# Patient Record
Sex: FEMALE | Race: WHITE | Employment: FULL TIME | URBAN - METROPOLITAN AREA
[De-identification: names, ages, dates, MRNs, and addresses within clinical notes are randomized per-mention and may not be internally consistent; named-entity substitution may affect disease eponyms.]

---

## 2017-09-01 ENCOUNTER — OFFICE VISIT (OUTPATIENT)
Dept: ORTHOPEDIC SURGERY | Age: 62
End: 2017-09-01

## 2017-09-01 VITALS
DIASTOLIC BLOOD PRESSURE: 79 MMHG | TEMPERATURE: 98.1 F | RESPIRATION RATE: 16 BRPM | OXYGEN SATURATION: 98 % | BODY MASS INDEX: 35.57 KG/M2 | WEIGHT: 181.2 LBS | SYSTOLIC BLOOD PRESSURE: 140 MMHG | HEART RATE: 86 BPM | HEIGHT: 60 IN

## 2017-09-01 DIAGNOSIS — Z98.890 H/O FOOT SURGERY: ICD-10-CM

## 2017-09-01 DIAGNOSIS — M79.2 NEURITIS: Primary | ICD-10-CM

## 2017-09-01 RX ORDER — LIDOCAINE 50 MG/G
PATCH TOPICAL
Qty: 1 PACKAGE | Refills: 1 | Status: SHIPPED | OUTPATIENT
Start: 2017-09-01 | End: 2018-03-07

## 2017-09-01 NOTE — PROGRESS NOTES
AMBULATORY PROGRESS NOTE      Patient: Itz Baeza             MRN: [id number]     SSN: [id number] Body mass index is 35.39 kg/(m^2). YOB: 1955     AGE: 63 y.o. EX: female    PCP: Luther Bojorquez MD       IMPRESSION/DIAGNOSIS AND TREATMENT PLAN      DIAGNOSES  1. Neuritis    2. H/O foot surgery        Orders Placed This Encounter    [[id number]] Foot Min 3V    lidocaine (LIDODERM) 5 %      Itz Baeza understands her diagnoses and the proposed plan. PLAN //  HPI AND EXAMINATION      Gonzales Hobbs IS A 63 y.o. female who presents to my outpatient office for evaluation of:      My overall impression is a 21-year-old female who has had spontaneous pain and discomfort to the right great toe first MTP region and at the TMT region for about one month now. She had a remote bunionectomy surgery by Dr. Arnoldo Mccrudy. Abhishek Springer, 20 years ago and did quite well from this. She had a proximal osteotomy of her first metatarsal in a number two toe hammertoe PIP resection arthroplasty with a K-wire fixation with K-wire fixation removal.  for the last month, however, she reports having a burning, tingling type pain along the dorsomedial portion of her foot, and difficulty allowing anything to touch the medial portion of her foot, i.e. bedsheets and the water from the shower stall. She denies any history of any pack troubles. She denies any history of multiple sclerosis. She has no history of diabetes and no history of trauma. She arrives here wearing flip flops after having been unable to put on an enclosed-type shoe for about one month now. She has no history of inflammatory arthropathy such as rheumatoid arthritis, lupus, psoriasis, and/or gout. Her x-rays are dictated in the diagnostic section of the report.   It does show some mild osteoarthritic changes to the first MTP region, a shortened, first metatarsal with some mild osteoarthritic changes to the right great toe first MTP region, as there is some slight joint space narrowing and an elongated second metatarsal as a result of her bunionectomy procedure. There is some slight extension of the right number two toe but no dislocation to the right number two toe at the MTP region. On examination, the patient is alert and follows commands. She is in no acute distress. Her affect and mood are appropriate. She arrives here unaccompanied. She is wearing flip flops. Her right foot is examined. She has a well-healed, remote surgical scar along the medial portion of the right first ray that goes from her first TMT region going all the way down towards the first MTP region distally. She has some numbness in the dorsomedial cutaneous nerve, terminal branch of the SPN nerve. She has some slight reddened discoloration along the medial portion of the right great toe and to the lateral portion of the right great toe and to the lateral portion of the right great toe. However, when I touch the lateral portion of her right great toe, there is no numbness or tingling on this lateral portion. It is the medial portion along the dorsomedial cutaneous nerve distribution where she has this dysesthetic discomfort with light touch to this region, positive Tinels sign. I cannot palpate a firm fullness to this terminal branch of this SPN nerve along the medial portion of her foot. She has a well-healed, surgical, remote scar. Otherwise, her pulses are intact. Toes are nice and warm and pink. There are no signs of DVT and/or thrombophlebitis. Clinically, there are no gross signs of RSD. The discomfort she has is in the terminal branch of the dorsomedial cutaneous nerve, terminal branch of the SPN nerve. The sural nerve is normal.  The saphenous nerve is normal in terms of light touch and deep touch. There is no tenderness in this region. The DPN nerve is normal in the first web space and into the dorsomedial portion of her foot.  Again, her pulses are intact. Toes are nice and warm and pink. There are no signs of DVT and/or thrombophlebitis. I had a lengthy discussion with her. My impression is dorsomedial cutaneous nerve neuritis. I am not sure why this started just one month ago. recommendation is to wear an open type shoe at this point. I am going to put her on a Lidoderm patch, 12 hours on and 12 hours off for about three weeks to see if this helps her at all. Should this not help her, I am going to recommend starting her on Neurontin. We will probably start her on Neurontin 300 mg one po q,h.s. for several weeks and then see whether we need to go up on this. She will require an extra-depth type shoe particularly when the weather changes so there is minimal pressure on the medial portion of her foot. CHART REVIEW      Past Medical History:   Diagnosis Date    Chronic kidney disease     kidney stones    Joint swelling      Current Outpatient Prescriptions   Medication Sig    lidocaine (LIDODERM) 5 % Apply patch to the affected area for 12 hours a day and remove for 12 hours a day.  ciprofloxacin HCl (CIPRO) 500 mg tablet Take one upon receiving the rx, then one at bedtime or 12 hours later    oxyCODONE-acetaminophen (PERCOCET) 5-325 mg per tablet Take 1 Tab by mouth every four (4) hours as needed for Pain.  tamsulosin (FLOMAX) 0.4 mg capsule Take 1 Cap by mouth daily.  oxybutynin chloride XL (DITROPAN XL) 5 mg CR tablet Take 1 Tab by mouth daily.  traMADol (ULTRAM) 50 mg tablet Take 50 mg by mouth every six (6) hours as needed for Pain. Indications: PAIN    furosemide (LASIX) 20 mg tablet Take 20 mg by mouth as needed. Indications: EDEMA    ergocalciferol (VITAMIN D2) 50,000 unit capsule Take 50,000 Units by mouth.  magnesium oxide (MAGOX) 400 mg tablet Take 400 mg by mouth daily.  KLOR-CON 10 10 mEq tablet      No current facility-administered medications for this visit.       Allergies   Allergen Reactions    Iodinated Contrast- Oral And Iv Dye Other (comments)     Past Surgical History:   Procedure Laterality Date    HX APPENDECTOMY      HX GI      flopy colon    HX GYN      ov cyst hyst     HX ORTHOPAEDIC      ft foot reconst     Social History     Occupational History    Not on file. Social History Main Topics    Smoking status: Current Every Day Smoker     Packs/day: 0.50    Smokeless tobacco: Never Used    Alcohol use Yes      Comment: social    Drug use: No    Sexual activity: Yes     Family History   Problem Relation Age of Onset    Cancer Mother     Alzheimer Mother     Cancer Father         REVIEW OF SYSTEMS : 9/1/2017  ALL BELOW ARE Negative except : SEE HPI       REVIEW OF SYSTEMS : 9/1/2017  ALL BELOW ARE Negative except : SEE HPI     CONSTITUTIONAL: denies chills, fatigue, fever, weight change   PSYCH: denies anxiety, depression, irritability or mood swings   ENT: denies - headaches, hearing change, nasal congestion, oral lesions, or sore throat   HEM/ONC denies - bleeding problems, bruising, pallor or swollen lymph nodes   ENDO: denies hot flashes, polydipsia/polyuria or temperature intolerance   RESP: denies - cough, shortness of breath or wheezing   CV: denies - chest pain, edema or palpitations, ORTIZ  GI: denies - abdominal pain, change in bowel habits, constipation, diarrhea or nausea/vomiting   : denies - dysuria, hematuria, incontinence, pelvic pain   MSK: denies  - See HPI. NEURO: denies - confusion, headaches, seizures or weakness   DERM: denies - dry skin, hair changes, rash or skin lesion changes  VASCULAR: Peripheral Vascular: No calf pain, vascular vein tenderness to calf pain              No calf throbbing, posterior knee throbbing pain      DIAGNOSTIC IMAGING        Dictation on: 09/01/2017  9:27 AM by: Bree Patel [[id number]]          Please see above section of this report. I have personally reviewed the results of the above study.  The interpretation of this study is my professional opinion.       Maura Guerra MD  9/1/2017  9:28 AM

## 2017-09-01 NOTE — PROCEDURES
X-rays of the right foot, three views, AP, lateral, and oblique: There is some narrowing of the first TMT region. I can see where she had an osteotomy of the first metatarsal, corrective osteotomy. Her BRIAN angle is very small. Her first metatarsophalangeal joint is shorter on the right compared to that on the left after having had her osteotomy of the proximal metatarsal 20 years ago. She has slight flexion alignment of the right number two toe MTP region. Otherwise, no dislocation.

## 2017-09-01 NOTE — PATIENT INSTRUCTIONS
X-rays from today were reviewed and discussed with you today     Superficial peroneal nerve inflamed  Discussed ordering a EMG/NCS of the right lower extremity ( We will hold off test at this time)  Discussed a lidoderm patch (12 hours on, 12 hours off)  Dicussed Neurontin or Lyrica  STRESS VITAMIN B  FIT FLOPS WWWLFIT FLOPS  Follow up with Dr. Meg Anne 3-4 weeks  Call if symptoms worsen [phone]  Budin splint has been given to you today for the 2nd toe on the right foot         Electromyogram (EMG) and Nerve Conduction Studies: About These Tests  What are they? An electromyogram (EMG) measures the electrical activity of your muscles when you are not using them (at rest) and when you tighten them (muscle contraction). Nerve conduction studies (NCS) measure how well and how fast the nerves can send electrical signals. EMG and nerve conduction studies are often done together. If they are done together, the nerve conduction studies are done before the EMG. Why are they done? You may need an EMG to find diseases that damage your muscles or nerves or to find why you cannot move your muscles (paralysis), why they feel weak, or why they twitch. You may need nerve conduction studies to find damage to the nerves that lead from the brain and spinal cord to the rest of the body (peripheral nervous system). Nerve conduction studies are often used to help find nerve disorders, such as carpal tunnel syndrome. How can you prepare for these tests? · Tell your doctors ALL the medicines, vitamins, supplements, and herbal remedies you take. Some medicines can affect the test results. You may need to stop taking some medicines before you have this test.  · If you take aspirin or some other blood thinner, be sure to talk to your doctor. He or she will tell you if you should stop taking it before your test. Make sure that you understand exactly what your doctor wants you to do. · Wear loose-fitting clothing.  You may be given a hospital gown to wear. · The electrodes for the test are attached to your skin. Your skin needs to be clean and free of sprays, oils, creams, and lotions. What happens during the tests? You lie on a table or bed or sit in a reclining chair so your muscles are relaxed. For an EMG:  · Your doctor will insert a needle electrode into a muscle. This will record the electrical activity while the muscle is at rest. You may feel a quick, sharp pain when the needle electrode is put into a muscle. · Your doctor will ask you to tighten the same muscle slowly and steadily while the electrical activity is recorded. · Your doctor may move the electrode to a different area of the muscle or a different muscle. For nerve conduction studies:  · Your doctor will attach two types of electrodes to your skin. ¨ One type of electrode is placed over a nerve and will give the nerve an electrical pulse. ¨ The other type of electrode is placed over the muscle that the nerve controls. It will record how long it takes the muscle to react to the electrical pulse. · You will be able to feel the electrical pulses. They are small shocks and are safe. What else should you know about these tests? · After an EMG, you may be sore and have a tingling feeling in your muscles for up to 2 days. You may have small bruises or swelling at the needle site. · For an EMG, you may be asked to sign a consent form. Talk to your doctor about any concerns you have about the need for the test, its risks, how it will be done, or what the results will mean. How long do they take? · An EMG may take 30 to 60 minutes. · Nerve conduction tests may take from 15 minutes to 1 hour or more. It depends on how many nerves and muscles your doctor tests. What happens after these tests? · If any of the test areas are sore:  ¨ Put ice or a cold pack on the area for 10 to 20 minutes at a time. Put a thin cloth between the ice and your skin.   ¨ Take an over-the-counter pain medicine, such as acetaminophen (Tylenol), ibuprofen (Advil, Motrin), or naproxen (Aleve). Be safe with medicines. Read and follow all instructions on the label. · You will probably be able to go home right away. · You can go back to your usual activities right away. When should you call for help? Watch closely for changes in your health, and be sure to contact your doctor if:  · Muscle pain from an EMG test gets worse or you have swelling, tenderness, or pus at any of the needle sites. · You have any problems that you think may be from the test.  · You have any questions about the test or have not received your results. Follow-up care is a key part of your treatment and safety. Be sure to make and go to all appointments, and call your doctor if you are having problems. It's also a good idea to keep a list of the medicines you take. Ask your doctor when you can expect to have your test results. Where can you learn more? Go to http://bud-ad.info/. Enter B904 in the search box to learn more about \"Electromyogram (EMG) and Nerve Conduction Studies: About These Tests. \"  Current as of: October 14, 2016  Content Version: 11.3  © 9373-3120 [employer]. Care instructions adapted under license by [employer] (which disclaims liability or warranty for this information). If you have questions about a medical condition or this instruction, always ask your healthcare professional. [place] [address] any warranty or liability for your use of this information.

## 2017-09-01 NOTE — PROGRESS NOTES
Patient is here today complaining of right great toe pain. She states the the pain is \"right on top of the big toe\"  She denies any injury. She has had a previous surgery on this foot by Dr. Daad Martinez. She states she is only able to wear flip flops. Patient states this pain started about a month ago.

## 2017-09-01 NOTE — MR AVS SNAPSHOT
Visit Information Date & Time Provider Department Dept. Phone Encounter #  
 9/1/2017  8:30 AM Mirian Navarro, 27 [facility] [phone] [id number] Upcoming Health Maintenance Date Due Hepatitis C Screening 1955 Pneumococcal 19-64 Medium Risk (1 of 1 - PPSV23) 4/16/1976 FOBT Q 1 YEAR AGE 50-75 4/16/2007 ZOSTER VACCINE AGE 60> 2/16/2017 INFLUENZA AGE 9 TO ADULT 8/1/2017 PAP AKA CERVICAL CYTOLOGY 1/1/2018 BREAST CANCER SCRN MAMMOGRAM 11/8/2018 DTaP/Tdap/Td series (2 - Td) 6/23/2025 Allergies as of 9/1/2017  Review Complete On: 9/1/2017 By: Mirian Navarro MD  
  
 Severity Noted Reaction Type Reactions Iodinated Contrast- Oral And Iv Dye  06/06/2014   Systemic Other (comments) Current Immunizations  Reviewed on 7/11/2014 Name Date Tdap 6/23/2015  9:25 AM  
  
 Not reviewed this visit You Were Diagnosed With   
  
 Codes Comments Neuritis    -  Primary ICD-10-CM: M79.2 ICD-9-CM: 729.2 RIGHT DORSO MEDIAL CUTANEOUS NEURITIS (TERMINAL SPN NERVE) H/O foot surgery     ICD-10-CM: Z98.890 ICD-9-CM: V15.29 Vitals BP Pulse Temp Resp Height(growth percentile) Weight(growth percentile) 140/79 86 98.1 °F (36.7 °C) 16 5' (1.524 m) 181 lb 3.2 oz (82.2 kg) SpO2 BMI OB Status Smoking Status 98% 35.39 kg/m2 Hysterectomy Current Every Day Smoker Vitals History BMI and BSA Data Body Mass Index Body Surface Area  
 35.39 kg/m 2 1.87 m 2 Preferred Pharmacy Pharmacy Name Phone [facility], [facility] [phone] Your Updated Medication List  
  
   
This list is accurate as of: 9/1/17  9:33 AM.  Always use your most recent med list.  
  
  
  
  
 ciprofloxacin HCl 500 mg tablet Commonly known as:  CIPRO Take one upon receiving the rx, then one at bedtime or 12 hours later KLOR-CON 10 10 mEq tablet Generic drug:  potassium chloride SR  
  
 LASIX 20 mg tablet Generic drug:  furosemide Take 20 mg by mouth as needed. Indications: EDEMA  
  
 lidocaine 5 % Commonly known as:  [place] Apply patch to the affected area for 12 hours a day and remove for 12 hours a day. MAGOX 400 mg tablet Generic drug:  magnesium oxide Take 400 mg by mouth daily. oxybutynin chloride XL 5 mg CR tablet Commonly known as:  DITROPAN XL Take 1 Tab by mouth daily. oxyCODONE-acetaminophen 5-325 mg per tablet Commonly known as:  PERCOCET Take 1 Tab by mouth every four (4) hours as needed for Pain.  
  
 tamsulosin 0.4 mg capsule Commonly known as:  FLOMAX Take 1 Cap by mouth daily. traMADol 50 mg tablet Commonly known as:  ULTRAM  
Take 50 mg by mouth every six (6) hours as needed for Pain. Indications: PAIN  
  
 VITAMIN D2 50,000 unit capsule Generic drug:  ergocalciferol Take 50,000 Units by mouth. Prescriptions Sent to Pharmacy Refills  
 lidocaine (LIDODERM) 5 % 1 Sig: Apply patch to the affected area for 12 hours a day and remove for 12 hours a day. Class: Normal  
 Pharmacy: [id number] [place] [address], [address] [place] [address] Ph #: [phone] We Performed the Following AMB POC XRAY, FOOT; COMPLETE, 3+ VIEW [93029 CPT(R)] Patient Instructions X-rays from today were reviewed and discussed with you today Superficial peroneal nerve inflamed Discussed ordering a EMG/NCS of the right lower extremity ( We will hold off test at this time) Discussed a lidoderm patch (12 hours on, 12 hours off) Dicussed Neurontin or Lyrica STRESS VITAMIN B 
FIT FLOPS WWWLFIT FLOPS Follow up with Dr. Suzanne Erwin 3-4 weeks Call if symptoms worsen [phone] Budin splint has been given to you today for the 2nd toe on the right foot Electromyogram (EMG) and Nerve Conduction Studies: About These Tests What are they? An electromyogram (EMG) measures the electrical activity of your muscles when you are not using them (at rest) and when you tighten them (muscle contraction). Nerve conduction studies (NCS) measure how well and how fast the nerves can send electrical signals. EMG and nerve conduction studies are often done together. If they are done together, the nerve conduction studies are done before the EMG. Why are they done? You may need an EMG to find diseases that damage your muscles or nerves or to find why you cannot move your muscles (paralysis), why they feel weak, or why they twitch. You may need nerve conduction studies to find damage to the nerves that lead from the brain and spinal cord to the rest of the body (peripheral nervous system). Nerve conduction studies are often used to help find nerve disorders, such as carpal tunnel syndrome. How can you prepare for these tests? · Tell your doctors ALL the medicines, vitamins, supplements, and herbal remedies you take. Some medicines can affect the test results. You may need to stop taking some medicines before you have this test. 
· If you take aspirin or some other blood thinner, be sure to talk to your doctor. He or she will tell you if you should stop taking it before your test. Make sure that you understand exactly what your doctor wants you to do. · Wear loose-fitting clothing. You may be given a hospital gown to wear. · The electrodes for the test are attached to your skin. Your skin needs to be clean and free of sprays, oils, creams, and lotions. What happens during the tests? You lie on a table or bed or sit in a reclining chair so your muscles are relaxed. For an EMG: 
· Your doctor will insert a needle electrode into a muscle. This will record the electrical activity while the muscle is at rest. You may feel a quick, sharp pain when the needle electrode is put into a muscle. · Your doctor will ask you to tighten the same muscle slowly and steadily while the electrical activity is recorded. · Your doctor may move the electrode to a different area of the muscle or a different muscle. For nerve conduction studies: 
· Your doctor will attach two types of electrodes to your skin. ¨ One type of electrode is placed over a nerve and will give the nerve an electrical pulse. ¨ The other type of electrode is placed over the muscle that the nerve controls. It will record how long it takes the muscle to react to the electrical pulse. · You will be able to feel the electrical pulses. They are small shocks and are safe. What else should you know about these tests? · After an EMG, you may be sore and have a tingling feeling in your muscles for up to 2 days. You may have small bruises or swelling at the needle site. · For an EMG, you may be asked to sign a consent form. Talk to your doctor about any concerns you have about the need for the test, its risks, how it will be done, or what the results will mean. How long do they take? · An EMG may take 30 to 60 minutes. · Nerve conduction tests may take from 15 minutes to 1 hour or more. It depends on how many nerves and muscles your doctor tests. What happens after these tests? · If any of the test areas are sore: ¨ Put ice or a cold pack on the area for 10 to 20 minutes at a time. Put a thin cloth between the ice and your skin. ¨ Take an over-the-counter pain medicine, such as acetaminophen (Tylenol), ibuprofen (Advil, Motrin), or naproxen (Aleve). Be safe with medicines. Read and follow all instructions on the label. · You will probably be able to go home right away. · You can go back to your usual activities right away. When should you call for help? Watch closely for changes in your health, and be sure to contact your doctor if: · Muscle pain from an EMG test gets worse or you have swelling, tenderness, or pus at any of the needle sites. · You have any problems that you think may be from the test. 
· You have any questions about the test or have not received your results. Follow-up care is a key part of your treatment and safety. Be sure to make and go to all appointments, and call your doctor if you are having problems. It's also a good idea to keep a list of the medicines you take. Ask your doctor when you can expect to have your test results. Where can you learn more? Go to http://bud-ad.info/. Enter J849 in the search box to learn more about \"Electromyogram (EMG) and Nerve Conduction Studies: About These Tests. \" Current as of: October 14, 2016 Content Version: 11.3 © 2297-7550 [employer]. Care instructions adapted under license by [employer] (which disclaims liability or warranty for this information). If you have questions about a medical condition or this instruction, always ask your healthcare professional. [address] any warranty or liability for your use of this information. Patient Instructions History Introducing [facility]! Jenise Devine introduces Route[employer] patient portal. Now you can access parts of your medical record, email your doctor's office, and request medication refills online. 1. In your internet browser, go to [email or web address] 2. Click on the First Time User? Click Here link in the Sign In box. You will see the New Member Sign Up page. 3. Enter your [employer] Access Code exactly as it appears below. You will not need to use this code after youve completed the sign-up process. If you do not sign up before the expiration date, you must request a new code. · Routezilla Access Code: [email or web address] Expires: 11/30/2017  9:25 AM 
 
4.  Enter the last four digits of your Social Security Number (xxxx) and Date of Birth (mm/dd/yyyy) as indicated and click Submit. You will be taken to the next sign-up page. 5. Create a [facility] ID. This will be your [facility] login ID and cannot be changed, so think of one that is secure and easy to remember. 6. Create a [facility] password. You can change your password at any time. 7. Enter your Password Reset Question and Answer. This can be used at a later time if you forget your password. 8. Enter your e-mail address. You will receive e-mail notification when new information is available in [address] 9. Click Sign Up. You can now view and download portions of your medical record. 10. Click the Download Summary menu link to download a portable copy of your medical information. If you have questions, please visit the Frequently Asked Questions section of the [facility] website. Remember, [facility] is NOT to be used for urgent needs. For medical emergencies, dial 911. Now available from your iPhone and Android! Please provide this summary of care documentation to your next provider. Your primary care clinician is listed as Harvey Fair. If you have any questions after today's visit, please call [phone].

## 2017-09-26 ENCOUNTER — OFFICE VISIT (OUTPATIENT)
Dept: ORTHOPEDIC SURGERY | Age: 62
End: 2017-09-26

## 2017-09-26 VITALS
TEMPERATURE: 98.3 F | BODY MASS INDEX: 35.73 KG/M2 | SYSTOLIC BLOOD PRESSURE: 138 MMHG | WEIGHT: 182 LBS | OXYGEN SATURATION: 97 % | HEIGHT: 60 IN | HEART RATE: 88 BPM | DIASTOLIC BLOOD PRESSURE: 71 MMHG

## 2017-09-26 DIAGNOSIS — G57.91 NEURITIS OF RIGHT FOOT: Primary | ICD-10-CM

## 2017-09-26 NOTE — PATIENT INSTRUCTIONS
Please follow up in 2 months. You are advised to contact us if your condition worsens. Arch Pain: Exercises  Your Care Instructions  Here are some examples of typical rehabilitation exercises for your condition. Start each exercise slowly. Ease off the exercise if you start to have pain. Your doctor or physical therapist will tell you when you can start these exercises and which ones will work best for you. How to do the exercises  Plantar fascia stretch    1. Sit in a chair and put your affected foot on your other knee. 2. Hold the heel of your foot in one hand, and grasp your toes with the other hand. 3. Pull on your heel (toward your body), and at the same time pull your toes back with your other hand. 4. You should feel a stretch along the bottom of your foot. 5. Hold 15 to 30 seconds. 6. Repeat 2 to 4 times. Plantar fascia stretch (kneeling)    Note: You may want to place a pillow under your knees for this exercise. 1. Get on your hands and knees on the floor. Keep your heels pointing up and the balls of your feet and your toes on the floor. 2. Slowly sit back toward your ankles. 3. If this is too hard, you can try doing it one leg at a time. Stand up, and then kneel on one knee and keep the other leg forward. Place the foot of your forward leg flat on the ground and bend that knee. The heel on the leg still behind you should point up. The ball and toes of that foot should be on the floor. Sit back toward that ankle. 4. Hold 15 to 30 seconds. 5. Repeat 2 to 4 times. Switch legs if you are doing this one leg at a time. Plantar fascia self-massage    1. Sit in a chair. 2. Place your affected foot on a firm, tube-shaped object, such as a can or water bottle. 3. Roll your foot back and forth over the object to massage the bottom of your foot. 4. If you want to do ice massage, fill a water bottle about three-fourths of the way full and freeze before using.   5. Continue for 2 to 5 minutes. Bilateral calf stretch (knees straight)    1. Place a book on the floor a few inches from a wall or countertop, and put the balls of your feet on it. Your heels should be on the floor. The book needs to be thick enough so that you can feel a gentle stretch in each calf. If you are not steady on your feet, hold on to a chair, counter, or wall while you do this stretch. 2. Keep your knees straight, and lean forward until you feel a stretch in each calf. 3. To get more stretch, add another book or use a thicker book, such as a phone book, a dictionary, or an encyclopedia. 4. Hold the stretch for at least 15 to 30 seconds. 5. Repeat 2 to 4 times. Bilateral calf stretch (knees bent)    1. Place a book on the floor a few inches from a wall or countertop, and put the balls of your feet on it. Your heels should be on the floor. The book needs to be thick enough so that you can feel a gentle stretch in each calf. If you are not steady on your feet, hold on to a chair, counter, or wall while you do this stretch. 2. Bend your knees, and lean forward until you feel a stretch in each calf. 3. To get more stretch, add another book or use a thicker book, such as a phone book, a dictionary, or an encyclopedia. 4. Hold the stretch for at least 15 to 30 seconds. 5. Repeat 2 to 4 times. [place] pick-ups    1. Put some marbles on the floor next to a cup.  2. Sit down, and use the toes of your affected foot to lift up one marble from the floor at a time. Then try to put the marble in the cup.  3. Repeat 8 to 12 times. Towel scrunches    1. Sit down, and place your affected foot on a towel on the floor. You may also do this with both feet on the towel. 2. Scrunch the towel toward you with your toes. Then use your toes to push the towel back into place. 3. Repeat 8 to 12 times. Heel raises on a step    1. Stand on the bottom step of a staircase, facing up toward the stairs. Put the balls of your feet on the step.  If you are not steady on your feet, hold on to the banister or wall. 2. Keeping both knees straight, slowly lift your heels above the step so that you are standing on your toes. Then slowly lower your heels below the step and toward the floor. 3. Return to the starting position, with your feet even with the step. 4. Repeat 8 to 12 times. Follow-up care is a key part of your treatment and safety. Be sure to make and go to all appointments, and call your doctor if you are having problems. It's also a good idea to know your test results and keep a list of the medicines you take. Where can you learn more? Go to http://bud-ad.info/. Enter H119 in the search box to learn more about \"Arch Pain: Exercises. \"  Current as of: March 21, 2017  Content Version: 11.3  © 1828-3281 [employer]. Care instructions adapted under license by [employer] (which disclaims liability or warranty for this information). If you have questions about a medical condition or this instruction, always ask your healthcare professional. [address] any warranty or liability for your use of this information.

## 2017-09-26 NOTE — PROGRESS NOTES
AMBULATORY PROGRESS NOTE      Patient: Scout Lopez             MRN: [id number]     SSN: [id number] Body mass index is 35.54 kg/(m^2). YOB: 1955     AGE: 63 y.o. EX: female    PCP: Nasreen Lamas MD    IMPRESSION/DIAGNOSIS AND TREATMENT PLAN     DIAGNOSES  1. Neuritis        Orders Placed This Encounter    AMB SUPPLY ORDER      Scout Lopez understands her diagnoses and the proposed plan. Plan:    1) Provide Budin Splints  2) Continue activity as tolerated  3) Continue using Lidoderm 5% patches    RTO - 2 months    HPI AND EXAMINATION     Scout Lopez IS A 63 y.o. female who presents to my outpatient office for evaluation of neuritis, and a h/o foot surgery. At last visit, I prescribed Lidoderm 5% patches. The patient presents to the office today stating that she is doing much better since she started using Lidoderm 5% patches, and Budin splints. She notes that she bought a new pair of sandals that have provided relief. The patient understands the information provided to her today. ANKLE/FOOT right    Psychiatry: Alert, Oriented x 3; Speech normal in context and clarity,            Memory intact grossly, no involuntary movements - tremors, no dementia  Gait: slow  Tenderness: mild tenderness dorsal aspect of foot  Cutaneous: WNL   Joint Motion: WNL  Joint / Tendon Stability: No Ankle or Subtalar instability or joint laxity. No peroneal sublux ability or dislocation  Alignment:  Normal Foot Alignment and  Flexible  Neuro Motor/Sensory: NL/NL  Vascular: NL foot/ankle pulses  Lymphatics: No extremity lymphedema, No calf swelling, no tenderness to calf muscles. CHART REVIEW     Past Medical History:   Diagnosis Date    Chronic kidney disease     kidney stones    Joint swelling      Current Outpatient Prescriptions   Medication Sig    lidocaine (LIDODERM) 5 % Apply patch to the affected area for 12 hours a day and remove for 12 hours a day.     furosemide (LASIX) 20 mg tablet Take 20 mg by mouth as needed. Indications: EDEMA    ciprofloxacin HCl (CIPRO) 500 mg tablet Take one upon receiving the rx, then one at bedtime or 12 hours later    oxyCODONE-acetaminophen (PERCOCET) 5-325 mg per tablet Take 1 Tab by mouth every four (4) hours as needed for Pain.  tamsulosin (FLOMAX) 0.4 mg capsule Take 1 Cap by mouth daily.  oxybutynin chloride XL (DITROPAN XL) 5 mg CR tablet Take 1 Tab by mouth daily.  traMADol (ULTRAM) 50 mg tablet Take 50 mg by mouth every six (6) hours as needed for Pain. Indications: PAIN    ergocalciferol (VITAMIN D2) 50,000 unit capsule Take 50,000 Units by mouth.  magnesium oxide (MAGOX) 400 mg tablet Take 400 mg by mouth daily.  KLOR-CON 10 10 mEq tablet      No current facility-administered medications for this visit. Allergies   Allergen Reactions    Iodinated Contrast- Oral And Iv Dye Other (comments)     Past Surgical History:   Procedure Laterality Date    HX APPENDECTOMY      HX GI      flopy colon    HX GYN      ov cyst hyst     HX ORTHOPAEDIC      ft foot reconst     Social History     Occupational History    Not on file. Social History Main Topics    Smoking status: Current Every Day Smoker     Packs/day: 0.50    Smokeless tobacco: Never Used    Alcohol use Yes      Comment: social    Drug use: No    Sexual activity: Yes     Family History   Problem Relation Age of Onset    Cancer Mother     Alzheimer Mother     Cancer Father        REVIEW OF SYSTEMS : 9/26/2017  ALL BELOW ARE Negative except : SEE HPI       Constitutional: Negative for fever, chills and weight loss. Neg Weigh Loss  Cardiovascular: Negative for chest pain, claudication and leg swelling. SOB, ORTIZ   Gastrointestinal: Negative for  pain, N/V/D/C, Blood in stool or urine,dysuria, hematuria,        Incontinence, pelvic pain  Musculoskeletal: see HPI. Neurological: Negative for dizziness and weakness.                  Negative for headaches,Visual Changes, Confusion, Seizures,   Psychiatric/Behavioral: Negative for depression, memory loss and substance abuse. Extremities:  Negative for  hair changes, rash or skin lesion changes. Hematologic: Negative for Bleeding problems, bruising, pallor or swollen lymph nodes. Peripheral Vascular: No calf pain, vascular vein tenderness to calf pain              No calf throbbing, posterior knee throbbing pain    DIAGNOSTIC IMAGING     No notes on file    Written by Alma Deal, as dictated by Ashtyn Euceda MD. IDr., Ashtyn Euceda MD, confirm that all documentation is accurate.

## 2017-09-26 NOTE — MR AVS SNAPSHOT
Visit Information Date & Time Provider Department Dept. Phone Encounter #  
 9/26/2017  8:45 AM Florentino Seip, [address] [facility] [phone] Upcoming Health Maintenance Date Due Hepatitis C Screening 1955 Pneumococcal 19-64 Medium Risk (1 of 1 - PPSV23) 4/16/1976 FOBT Q 1 YEAR AGE 50-75 4/16/2007 ZOSTER VACCINE AGE 60> 2/16/2017 INFLUENZA AGE 9 TO ADULT 8/1/2017 PAP AKA CERVICAL CYTOLOGY 1/1/2018 BREAST CANCER SCRN MAMMOGRAM 11/8/2018 DTaP/Tdap/Td series (2 - Td) 6/23/2025 Allergies as of 9/26/2017  Review Complete On: 9/26/2017 By: William Baels Severity Noted Reaction Type Reactions Iodinated Contrast- Oral And Iv Dye  06/06/2014   Systemic Other (comments) Current Immunizations  Reviewed on 7/11/2014 Name Date Tdap 6/23/2015  9:25 AM  
  
 Not reviewed this visit Vitals BP Pulse Temp Height(growth percentile) Weight(growth percentile) SpO2  
 138/71 88 98.3 °F (36.8 °C) (Oral) 5' (1.524 m) 182 lb (82.6 kg) 97% BMI OB Status Smoking Status 35.54 kg/m2 Hysterectomy Current Every Day Smoker BMI and BSA Data Body Mass Index Body Surface Area 35.54 kg/m 2 1.87 m 2 Preferred Pharmacy Pharmacy Name Phone [facility], [address] [place] [address] [phone] Your Updated Medication List  
  
   
This list is accurate as of: 9/26/17  9:48 AM.  Always use your most recent med list.  
  
  
  
  
 ciprofloxacin HCl 500 mg tablet Commonly known as:  CIPRO Take one upon receiving the rx, then one at bedtime or 12 hours later KLOR-CON 10 10 mEq tablet Generic drug:  potassium chloride SR  
  
 LASIX 20 mg tablet Generic drug:  furosemide Take 20 mg by mouth as needed. Indications: EDEMA  
  
 lidocaine 5 % Commonly known as:  Jaylen Byrd  
 Apply patch to the affected area for 12 hours a day and remove for 12 hours a day. MAGOX 400 mg tablet Generic drug:  magnesium oxide Take 400 mg by mouth daily. oxybutynin chloride XL 5 mg CR tablet Commonly known as:  DITROPAN XL Take 1 Tab by mouth daily. oxyCODONE-acetaminophen 5-325 mg per tablet Commonly known as:  PERCOCET Take 1 Tab by mouth every four (4) hours as needed for Pain.  
  
 tamsulosin 0.4 mg capsule Commonly known as:  FLOMAX Take 1 Cap by mouth daily. traMADol 50 mg tablet Commonly known as:  ULTRAM  
Take 50 mg by mouth every six (6) hours as needed for Pain. Indications: PAIN  
  
 VITAMIN D2 50,000 unit capsule Generic drug:  ergocalciferol Take 50,000 Units by mouth. Patient Instructions Please follow up in 2 months. You are advised to contact us if your condition worsens. Arch Pain: Exercises Your Care Instructions Here are some examples of typical rehabilitation exercises for your condition. Start each exercise slowly. Ease off the exercise if you start to have pain. Your doctor or physical therapist will tell you when you can start these exercises and which ones will work best for you. How to do the exercises Plantar fascia stretch 1. Sit in a chair and put your affected foot on your other knee. 2. Hold the heel of your foot in one hand, and grasp your toes with the other hand. 3. Pull on your heel (toward your body), and at the same time pull your toes back with your other hand. 4. You should feel a stretch along the bottom of your foot. 5. Hold 15 to 30 seconds. 6. Repeat 2 to 4 times. Plantar fascia stretch (kneeling) Note: You may want to place a pillow under your knees for this exercise. 1. Get on your hands and knees on the floor. Keep your heels pointing up and the balls of your feet and your toes on the floor. 2. Slowly sit back toward your ankles. 3. If this is too hard, you can try doing it one leg at a time. Stand up, and then kneel on one knee and keep the other leg forward. Place the foot of your forward leg flat on the ground and bend that knee. The heel on the leg still behind you should point up. The ball and toes of that foot should be on the floor. Sit back toward that ankle. 4. Hold 15 to 30 seconds. 5. Repeat 2 to 4 times. Switch legs if you are doing this one leg at a time. Plantar fascia self-massage 1. Sit in a chair. 2. Place your affected foot on a firm, tube-shaped object, such as a can or water bottle. 3. Roll your foot back and forth over the object to massage the bottom of your foot. 4. If you want to do ice massage, fill a water bottle about three-fourths of the way full and freeze before using. 5. Continue for 2 to 5 minutes. Bilateral calf stretch (knees straight) 1. Place a book on the floor a few inches from a wall or countertop, and put the balls of your feet on it. Your heels should be on the floor. The book needs to be thick enough so that you can feel a gentle stretch in each calf. If you are not steady on your feet, hold on to a chair, counter, or wall while you do this stretch. 2. Keep your knees straight, and lean forward until you feel a stretch in each calf. 3. To get more stretch, add another book or use a thicker book, such as a phone book, a dictionary, or an encyclopedia. 4. Hold the stretch for at least 15 to 30 seconds. 5. Repeat 2 to 4 times. Bilateral calf stretch (knees bent) 1. Place a book on the floor a few inches from a wall or countertop, and put the balls of your feet on it. Your heels should be on the floor. The book needs to be thick enough so that you can feel a gentle stretch in each calf. If you are not steady on your feet, hold on to a chair, counter, or wall while you do this stretch. 2. Bend your knees, and lean forward until you feel a stretch in each calf. 3. To get more stretch, add another book or use a thicker book, such as a phone book, a dictionary, or an encyclopedia. 4. Hold the stretch for at least 15 to 30 seconds. 5. Repeat 2 to 4 times. [place] pick-ups 1. Put some marbles on the floor next to a cup. 
2. Sit down, and use the toes of your affected foot to lift up one marble from the floor at a time. Then try to put the marble in the cup. 
3. Repeat 8 to 12 times. Towel scrunches 1. Sit down, and place your affected foot on a towel on the floor. You may also do this with both feet on the towel. 2. Scrunch the towel toward you with your toes. Then use your toes to push the towel back into place. 3. Repeat 8 to 12 times. Heel raises on a step 1. Stand on the bottom step of a staircase, facing up toward the stairs. Put the balls of your feet on the step. If you are not steady on your feet, hold on to the banister or wall. 2. Keeping both knees straight, slowly lift your heels above the step so that you are standing on your toes. Then slowly lower your heels below the step and toward the floor. 3. Return to the starting position, with your feet even with the step. 4. Repeat 8 to 12 times. Follow-up care is a key part of your treatment and safety. Be sure to make and go to all appointments, and call your doctor if you are having problems. It's also a good idea to know your test results and keep a list of the medicines you take. Where can you learn more? Go to http://bud-ad.info/. Enter H119 in the search box to learn more about \"Arch Pain: Exercises. \" Current as of: March 21, 2017 Content Version: 11.3 © 2169-0039 [employer], [employer]. Care instructions adapted under license by [employer] (which disclaims liability or warranty for this information).  If you have questions about a medical condition or this instruction, always ask your healthcare professional. [employer] disclaims any warranty or liability for your use of this information. Introducing [facility]! [facility] introduces Explain My Surgery patient portal. Now you can access parts of your medical record, email your doctor's office, and request medication refills online. 1. In your internet browser, go to [email or web address] 2. Click on the First Time User? Click Here link in the Sign In box. You will see the New Member Sign Up page. 3. Enter your Explain My Surgery Access Code exactly as it appears below. You will not need to use this code after youve completed the sign-up process. If you do not sign up before the expiration date, you must request a new code. · Explain My Surgery Access Code: [email or web address] Expires: 11/30/2017  9:25 AM 
 
4. Enter the last four digits of your Social Security Number (xxxx) and Date of Birth (mm/dd/yyyy) as indicated and click Submit. You will be taken to the next sign-up page. 5. Create a Explain My Surgery ID. This will be your Explain My Surgery login ID and cannot be changed, so think of one that is secure and easy to remember. 6. Create a Explain My Surgery password. You can change your password at any time. 7. Enter your Password Reset Question and Answer. This can be used at a later time if you forget your password. 8. Enter your e-mail address. You will receive e-mail notification when new information is available in [address] 9. Click Sign Up. You can now view and download portions of your medical record. 10. Click the Download Summary menu link to download a portable copy of your medical information. If you have questions, please visit the Frequently Asked Questions section of the Explain My Surgery website. Remember, Explain My Surgery is NOT to be used for urgent needs. For medical emergencies, dial 911. Now available from your iPhone and Android! Please provide this summary of care documentation to your next provider. Your primary care clinician is listed as Arcelia Mehrdad. If you have any questions after today's visit, please call [phone].

## 2017-11-27 ENCOUNTER — OFFICE VISIT (OUTPATIENT)
Dept: ORTHOPEDIC SURGERY | Age: 62
End: 2017-11-27

## 2017-11-27 VITALS
WEIGHT: 181.6 LBS | SYSTOLIC BLOOD PRESSURE: 137 MMHG | HEART RATE: 85 BPM | DIASTOLIC BLOOD PRESSURE: 76 MMHG | BODY MASS INDEX: 35.65 KG/M2 | HEIGHT: 60 IN | OXYGEN SATURATION: 99 % | TEMPERATURE: 97.7 F

## 2017-11-27 NOTE — PROGRESS NOTES
AMBULATORY PROGRESS NOTE      Patient: Naveen Contreras             MRN: [id number]     SSN: [id number] There is no height or weight on file to calculate BMI. YOB: 1955     AGE: 63 y.o. EX: female    PCP: Judeen Severs, MD    IMPRESSION/DIAGNOSIS AND TREATMENT PLAN     DIAGNOSES  No diagnosis found. No orders of the defined types were placed in this encounter. Naveen Contreras understands her diagnoses and the proposed plan. Plan:    1)  2)    RTO     HPI AND EXAMINATION     Jorge Hobbs IS A 63 y.o. female who presents to my outpatient office for follow up of neuritis, and a h/o foot surgery. At last visit, I provided Budin Splints, recommended to continue using Lidoderm 5% patches, and continue activity as tolerated. The patient presents to the office today ***. ANKLE/FOOT right     Psychiatry: Alert, Oriented x 3; Speech normal in context and clarity,                                 Memory intact grossly, no involuntary movements - tremors, no dementia  Gait: slow  Tenderness: mild tenderness dorsal aspect of foot  Cutaneous: WNL   Joint Motion: WNL  Joint / Tendon Stability: No Ankle or Subtalar instability or joint laxity. No peroneal sublux ability or dislocation  Alignment:  Normal Foot Alignment and  Flexible  Neuro Motor/Sensory: NL/NL  Vascular: NL foot/ankle pulses  Lymphatics: No extremity lymphedema, No calf swelling, no tenderness to calf muscles. ***  CHART REVIEW     Past Medical History:   Diagnosis Date    Chronic kidney disease     kidney stones    Joint swelling      Current Outpatient Prescriptions   Medication Sig    lidocaine (LIDODERM) 5 % Apply patch to the affected area for 12 hours a day and remove for 12 hours a day.     ciprofloxacin HCl (CIPRO) 500 mg tablet Take one upon receiving the rx, then one at bedtime or 12 hours later    oxyCODONE-acetaminophen (PERCOCET) 5-325 mg per tablet Take 1 Tab by mouth every four (4) hours as needed for Pain.  tamsulosin (FLOMAX) 0.4 mg capsule Take 1 Cap by mouth daily.  oxybutynin chloride XL (DITROPAN XL) 5 mg CR tablet Take 1 Tab by mouth daily.  traMADol (ULTRAM) 50 mg tablet Take 50 mg by mouth every six (6) hours as needed for Pain. Indications: PAIN    furosemide (LASIX) 20 mg tablet Take 20 mg by mouth as needed. Indications: EDEMA    ergocalciferol (VITAMIN D2) 50,000 unit capsule Take 50,000 Units by mouth.  magnesium oxide (MAGOX) 400 mg tablet Take 400 mg by mouth daily.  KLOR-CON 10 10 mEq tablet      No current facility-administered medications for this visit. Allergies   Allergen Reactions    Iodinated Contrast- Oral And Iv Dye Other (comments)     Past Surgical History:   Procedure Laterality Date    HX APPENDECTOMY      HX GI      flopy colon    HX GYN      ov cyst hyst     HX ORTHOPAEDIC      ft foot reconst     Social History     Occupational History    Not on file. Social History Main Topics    Smoking status: Current Every Day Smoker     Packs/day: 0.50    Smokeless tobacco: Never Used    Alcohol use Yes      Comment: social    Drug use: No    Sexual activity: Yes     Family History   Problem Relation Age of Onset    Cancer Mother     Alzheimer Mother     Cancer Father        REVIEW OF SYSTEMS : 11/27/2017  ALL BELOW ARE Negative except : SEE HPI       Constitutional: Negative for fever, chills and weight loss. Neg Weigh Loss  Cardiovascular: Negative for chest pain, claudication and leg swelling. SOB, ORTIZ   Gastrointestinal: Negative for  pain, N/V/D/C, Blood in stool or urine,dysuria, hematuria,        Incontinence, pelvic pain  Musculoskeletal: see HPI. Neurological: Negative for dizziness and weakness. Negative for headaches,Visual Changes, Confusion, Seizures,   Psychiatric/Behavioral: Negative for depression, memory loss and substance abuse.    Extremities:  Negative for  hair changes, rash or skin lesion changes. Hematologic: Negative for Bleeding problems, bruising, pallor or swollen lymph nodes. Peripheral Vascular: No calf pain, vascular vein tenderness to calf pain              No calf throbbing, posterior knee throbbing pain    DIAGNOSTIC IMAGING     No notes on file    Written by Kg Faulkner, as dictated by Roselia Rangel MD. IDr., Roselia Rangel MD, confirm that all documentation is accurate.

## 2017-12-05 ENCOUNTER — OFFICE VISIT (OUTPATIENT)
Dept: ORTHOPEDIC SURGERY | Age: 62
End: 2017-12-05

## 2017-12-05 VITALS
RESPIRATION RATE: 18 BRPM | SYSTOLIC BLOOD PRESSURE: 123 MMHG | OXYGEN SATURATION: 98 % | WEIGHT: 180.4 LBS | DIASTOLIC BLOOD PRESSURE: 71 MMHG | HEART RATE: 88 BPM | TEMPERATURE: 97.6 F | HEIGHT: 60 IN | BODY MASS INDEX: 35.42 KG/M2

## 2017-12-05 DIAGNOSIS — Z98.890 H/O FOOT SURGERY: ICD-10-CM

## 2017-12-05 DIAGNOSIS — G57.91 NEURITIS OF RIGHT FOOT: Primary | ICD-10-CM

## 2017-12-05 NOTE — PATIENT INSTRUCTIONS
Please follow up as needed. You are advised to contact us if your condition worsens. Recommended supportive shoewear: [place] Ty. Diabetic Neuropathy: Care Instructions  Your Care Instructions    When you have diabetes, your blood sugar level may get too high. Over time, high blood sugar levels can damage nerves. This is called diabetic neuropathy. Nerve damage can cause pain, burning, tingling, and numbness and may leave you feeling weak. The feet are often affected. When you have nerve damage in your feet, you cannot feel your feet and toes as well as normal and may not notice cuts or sores. Even a small injury can lead to a serious infection. It is very important that you follow your doctor's advice on foot care. Sometimes diabetes damages nerves that help the body function. If this happens, your blood pressure, sweating, digestion, and urination might be affected. Your doctor may give you a target blood sugar level that is higher or lower than you are used to. Try to keep your blood sugar very close to this target level to prevent more damage. Follow-up care is a key part of your treatment and safety. Be sure to make and go to all appointments, and call your doctor if you are having problems. It's also a good idea to know your test results and keep a list of the medicines you take. How can you care for yourself at home? · Take your medicines exactly as prescribed. Call your doctor if you think you are having a problem with your medicine. It is very important that you take your insulin or diabetes pills as your doctor tells you. · Try to keep blood sugar at your target level. ¨ Eat a variety of healthy foods, with carbohydrate spread out in your meals. A dietitian can help you plan meals. ¨ Try to get at least 30 minutes of exercise on most days. ¨ Check your blood sugar as many times each day as your doctor recommends.   · Take and record your blood pressure at home if your doctor tells you to. Learn the importance of the two measures of blood pressure (such as 130 over 80, or 130/80). To take your blood pressure at home:  ¨ Ask your doctor to check your blood pressure monitor to be sure it is accurate and the cuff fits you. Also ask your doctor to watch you to make sure that you are using it right. ¨ Do not use medicine known to raise blood pressure (such as some nasal decongestant sprays) before taking your blood pressure. ¨ Avoid taking your blood pressure if you have just exercised or are nervous or upset. Rest at least 15 minutes before you take a reading. · Take pain medicines exactly as directed. ¨ If the doctor gave you a prescription medicine for pain, take it as prescribed. ¨ If you are not taking a prescription pain medicine, ask your doctor if you can take an over-the-counter medicine. · Do not smoke. Smoking can increase your chance for a heart attack or stroke. If you need help quitting, talk to your doctor about stop-smoking programs and medicines. These can increase your chances of quitting for good. · Limit alcohol to 2 drinks a day for men and 1 drink a day for women. Too much alcohol can cause health problems. · Eat small meals often, rather than 2 or 3 large meals a day. To care for your feet  · Prevent injury by wearing shoes at all times, even when you are indoors. · Do foot care as part of your daily routine. Wash your feet and then rub lotion on your feet, but not between your toes. Use a handheld mirror or magnifying mirror to inspect your feet for blisters, cuts, cracks, or sores. · Have your toenails trimmed and filed straight across. · Wear shoes and socks that fit well. Soft shoes that have good support and that fit well (such as tennis shoes) are best for your feet. · Check your shoes for any loose objects or rough edges before you put them on. · Ask your doctor to check your feet during each visit.  Your doctor may notice a foot problem you have missed. · Get early treatment for any foot problem, even a minor one. When should you call for help? Call your doctor now or seek immediate medical care if:  ? · You have symptoms of infection, such as:  ¨ Increased pain, swelling, warmth, or redness. ¨ Red streaks leading from the area. ¨ Pus draining from the area. ¨ A fever. ? · You have new or worse numbness, pain, or tingling in any part of your body. ? Watch closely for changes in your health, and be sure to contact your doctor if:  ? · You have a new problem with your feet, such as:  ¨ A new sore or ulcer. ¨ A break in the skin that is not healing after several days. ¨ Bleeding corns or calluses. ¨ An ingrown toenail. ? · You do not get better as expected. Where can you learn more? Go to http://bud-ad.info/. Enter F899 in the search box to learn more about \"Diabetic Neuropathy: Care Instructions. \"  Current as of: March 13, 2017  Content Version: 11.4  © 4650-1730 [employer]. Care instructions adapted under license by [employer] (which disclaims liability or warranty for this information). If you have questions about a medical condition or this instruction, always ask your healthcare professional. [address] any warranty or liability for your use of this information.

## 2017-12-05 NOTE — MR AVS SNAPSHOT
Visit Information Date & Time Provider Department Dept. Phone Encounter #  
 12/5/2017  8:00 AM Odette Davis, [address] [facility] [phone] Upcoming Health Maintenance Date Due Hepatitis C Screening 1955 Pneumococcal 19-64 Medium Risk (1 of 1 - PPSV23) 4/16/1976 FOBT Q 1 YEAR AGE 50-75 4/16/2007 ZOSTER VACCINE AGE 60> 2/16/2017 Influenza Age 5 to Adult 8/1/2017 PAP AKA CERVICAL CYTOLOGY 1/1/2018 BREAST CANCER SCRN MAMMOGRAM 11/8/2018 DTaP/Tdap/Td series (2 - Td) 6/23/2025 Allergies as of 12/5/2017  Review Complete On: 12/5/2017 By: Anne Marie Cade Severity Noted Reaction Type Reactions Iodinated Contrast- Oral And Iv Dye  06/06/2014   Systemic Other (comments) Current Immunizations  Reviewed on 7/11/2014 Name Date Tdap 6/23/2015  9:25 AM  
  
 Not reviewed this visit You Were Diagnosed With   
  
 Codes Comments Neuritis of right foot    -  Primary ICD-10-CM: G57.91 
ICD-9-CM: 355.8 H/O foot surgery     ICD-10-CM: Z98.890 ICD-9-CM: V15.29 Vitals BP Pulse Temp Resp Height(growth percentile) Weight(growth percentile) 123/71 88 97.6 °F (36.4 °C) (Oral) 18 5' (1.524 m) 180 lb 6.4 oz (81.8 kg) SpO2 BMI OB Status Smoking Status 98% 35.23 kg/m2 Hysterectomy Current Every Day Smoker BMI and BSA Data Body Mass Index Body Surface Area  
 35.23 kg/m 2 1.86 m 2 Preferred Pharmacy Pharmacy Name Phone [facility] [address] [facility], [address] [place] [phone] Your Updated Medication List  
  
   
This list is accurate as of: 12/5/17  8:45 AM.  Always use your most recent med list.  
  
  
  
  
 ciprofloxacin HCl 500 mg tablet Commonly known as:  CIPRO Take one upon receiving the rx, then one at bedtime or 12 hours later KLOR-CON 10 10 mEq tablet Generic drug:  potassium chloride SR  
  
 LASIX 20 mg tablet Generic drug:  furosemide Take 20 mg by mouth as needed. Indications: EDEMA  
  
 lidocaine 5 % Commonly known as:  Hildegarde Eboni Apply patch to the affected area for 12 hours a day and remove for 12 hours a day. MAGOX 400 mg tablet Generic drug:  magnesium oxide Take 400 mg by mouth daily. oxybutynin chloride XL 5 mg CR tablet Commonly known as:  DITROPAN XL Take 1 Tab by mouth daily. oxyCODONE-acetaminophen 5-325 mg per tablet Commonly known as:  PERCOCET Take 1 Tab by mouth every four (4) hours as needed for Pain.  
  
 tamsulosin 0.4 mg capsule Commonly known as:  FLOMAX Take 1 Cap by mouth daily. traMADol 50 mg tablet Commonly known as:  ULTRAM  
Take 50 mg by mouth every six (6) hours as needed for Pain. Indications: PAIN  
  
 VITAMIN D2 50,000 unit capsule Generic drug:  ergocalciferol Take 50,000 Units by mouth. Patient Instructions Please follow up as needed. You are advised to contact us if your condition worsens. Recommended supportive shoewear: Alton Eduardo. Diabetic Neuropathy: Care Instructions Your Care Instructions When you have diabetes, your blood sugar level may get too high. Over time, high blood sugar levels can damage nerves. This is called diabetic neuropathy. Nerve damage can cause pain, burning, tingling, and numbness and may leave you feeling weak. The feet are often affected. When you have nerve damage in your feet, you cannot feel your feet and toes as well as normal and may not notice cuts or sores. Even a small injury can lead to a serious infection. It is very important that you follow your doctor's advice on foot care. Sometimes diabetes damages nerves that help the body function. If this happens, your blood pressure, sweating, digestion, and urination might be affected.  Your doctor may give you a target blood sugar level that is higher or lower than you are used to. Try to keep your blood sugar very close to this target level to prevent more damage. Follow-up care is a key part of your treatment and safety. Be sure to make and go to all appointments, and call your doctor if you are having problems. It's also a good idea to know your test results and keep a list of the medicines you take. How can you care for yourself at home? · Take your medicines exactly as prescribed. Call your doctor if you think you are having a problem with your medicine. It is very important that you take your insulin or diabetes pills as your doctor tells you. · Try to keep blood sugar at your target level. ¨ Eat a variety of healthy foods, with carbohydrate spread out in your meals. A dietitian can help you plan meals. ¨ Try to get at least 30 minutes of exercise on most days. ¨ Check your blood sugar as many times each day as your doctor recommends. · Take and record your blood pressure at home if your doctor tells you to. Learn the importance of the two measures of blood pressure (such as 130 over 80, or 130/80). To take your blood pressure at home: ¨ Ask your doctor to check your blood pressure monitor to be sure it is accurate and the cuff fits you. Also ask your doctor to watch you to make sure that you are using it right. ¨ Do not use medicine known to raise blood pressure (such as some nasal decongestant sprays) before taking your blood pressure. ¨ Avoid taking your blood pressure if you have just exercised or are nervous or upset. Rest at least 15 minutes before you take a reading. · Take pain medicines exactly as directed. ¨ If the doctor gave you a prescription medicine for pain, take it as prescribed. ¨ If you are not taking a prescription pain medicine, ask your doctor if you can take an over-the-counter medicine. · Do not smoke.  Smoking can increase your chance for a heart attack or stroke. If you need help quitting, talk to your doctor about stop-smoking programs and medicines. These can increase your chances of quitting for good. · Limit alcohol to 2 drinks a day for men and 1 drink a day for women. Too much alcohol can cause health problems. · Eat small meals often, rather than 2 or 3 large meals a day. To care for your feet · Prevent injury by wearing shoes at all times, even when you are indoors. · Do foot care as part of your daily routine. Wash your feet and then rub lotion on your feet, but not between your toes. Use a handheld mirror or magnifying mirror to inspect your feet for blisters, cuts, cracks, or sores. · Have your toenails trimmed and filed straight across. · Wear shoes and socks that fit well. Soft shoes that have good support and that fit well (such as tennis shoes) are best for your feet. · Check your shoes for any loose objects or rough edges before you put them on. · Ask your doctor to check your feet during each visit. Your doctor may notice a foot problem you have missed. · Get early treatment for any foot problem, even a minor one. When should you call for help? Call your doctor now or seek immediate medical care if: 
? · You have symptoms of infection, such as: 
¨ Increased pain, swelling, warmth, or redness. ¨ Red streaks leading from the area. ¨ Pus draining from the area. ¨ A fever. ? · You have new or worse numbness, pain, or tingling in any part of your body. ? Watch closely for changes in your health, and be sure to contact your doctor if: 
? · You have a new problem with your feet, such as: ¨ A new sore or ulcer. ¨ A break in the skin that is not healing after several days. ¨ Bleeding corns or calluses. ¨ An ingrown toenail. ? · You do not get better as expected. Where can you learn more? Go to http://bud-ad.info/. Enter G539 in the search box to learn more about \"Diabetic Neuropathy: Care Instructions. \" 
 Current as of: March 13, 2017 Content Version: 11.4 © 8641-0367 Healthwise, [employer]. Care instructions adapted under license by [employer] (which disclaims liability or warranty for this information). If you have questions about a medical condition or this instruction, always ask your healthcare professional. [address] any warranty or liability for your use of this information. Introducing [facility]! [employer] introduces [employer] patient portal. Now you can access parts of your medical record, email your doctor's office, and request medication refills online. 1. In your internet browser, go to [email or web address] 2. Click on the First Time User? Click Here link in the Sign In box. You will see the New Member Sign Up page. 3. Enter your [employer] Access Code exactly as it appears below. You will not need to use this code after youve completed the sign-up process. If you do not sign up before the expiration date, you must request a new code. · [employer] Access Code: [email or web address] Expires: 3/5/2018  8:45 AM 
 
4. Enter the last four digits of your Social Security Number (xxxx) and Date of Birth (mm/dd/yyyy) as indicated and click Submit. You will be taken to the next sign-up page. 5. Create a [employer] ID. This will be your [employer] login ID and cannot be changed, so think of one that is secure and easy to remember. 6. Create a [employer] password. You can change your password at any time. 7. Enter your Password Reset Question and Answer. This can be used at a later time if you forget your password. 8. Enter your e-mail address. You will receive e-mail notification when new information is available in [address] 9. Click Sign Up. You can now view and download portions of your medical record. 10. Click the Download Summary menu link to download a portable copy of your medical information. If you have questions, please visit the Frequently Asked Questions section of the [employer] website. Remember, [employer] is NOT to be used for urgent needs. For medical emergencies, dial 911. Now available from your iPhone and Android! Please provide this summary of care documentation to your next provider. Your primary care clinician is listed as Finesse Hansen. If you have any questions after today's visit, please call [phone].

## 2017-12-05 NOTE — PROGRESS NOTES
AMBULATORY PROGRESS NOTE      Patient: Lilo Soto             MRN: [id number]     SSN: [id number] Body mass index is 35.23 kg/(m^2). YOB: 1955     AGE: 63 y.o. EX: female    PCP: Lina Johnson MD    IMPRESSION/DIAGNOSIS AND TREATMENT PLAN     DIAGNOSES  1. Neuritis of right foot    2. H/O foot surgery        No orders of the defined types were placed in this encounter. Lilo Soto understands her diagnoses and the proposed plan. Plan:    1) Recommended supportive shoewear: Brayden Ray. 2) Continue activity modification as directed. RTO - PRN    HPI AND EXAMINATION     Beverly Hobbs IS A 63 y.o. female who presents to my outpatient office for follow up of neuritis, and a h/o hammertoe foot surgery. At last visit, I provided Budin splints, recommended to continue using Lidoderm 5% patches, and continue activity as tolerated. The patient presents to the office today noting she has not experienced pain. Patient states that her second toe is deviating medially but she does not notice any pain or discomfort at this time. She reports only using Fit flops which allow her to walk without discomfort for prolonged periods of time. However, she is concerned about what to wear for the winter months and I have recommended various shoes to wear that will provide the necessary support. ANKLE/FOOT right     Psychiatry: Alert, Oriented x 3; Speech normal in context and clarity,                                 Memory intact grossly, no involuntary movements - tremors, no dementia  Gait: slow  Tenderness: mild tenderness dorsal aspect of foot  Cutaneous: WNL   Joint Motion: WNL  Joint / Tendon Stability: No Ankle or Subtalar instability or joint laxity.                                                                  No peroneal sublux ability or dislocation  Alignment:  Varus alignment to the 2,3,4 toes bilaterally and  Flexible  Neuro Motor/Sensory: NL/NL  Vascular: NL foot/ankle pulses  Lymphatics: No extremity lymphedema, No calf swelling, no tenderness to calf muscles. CHART REVIEW     Past Medical History:   Diagnosis Date    Chronic kidney disease     kidney stones    Joint swelling      Current Outpatient Prescriptions   Medication Sig    lidocaine (LIDODERM) 5 % Apply patch to the affected area for 12 hours a day and remove for 12 hours a day.  ciprofloxacin HCl (CIPRO) 500 mg tablet Take one upon receiving the rx, then one at bedtime or 12 hours later    oxyCODONE-acetaminophen (PERCOCET) 5-325 mg per tablet Take 1 Tab by mouth every four (4) hours as needed for Pain.  tamsulosin (FLOMAX) 0.4 mg capsule Take 1 Cap by mouth daily.  oxybutynin chloride XL (DITROPAN XL) 5 mg CR tablet Take 1 Tab by mouth daily.  traMADol (ULTRAM) 50 mg tablet Take 50 mg by mouth every six (6) hours as needed for Pain. Indications: PAIN    furosemide (LASIX) 20 mg tablet Take 20 mg by mouth as needed. Indications: EDEMA    ergocalciferol (VITAMIN D2) 50,000 unit capsule Take 50,000 Units by mouth.  magnesium oxide (MAGOX) 400 mg tablet Take 400 mg by mouth daily.  KLOR-CON 10 10 mEq tablet      No current facility-administered medications for this visit. Allergies   Allergen Reactions    Iodinated Contrast- Oral And Iv Dye Other (comments)     Past Surgical History:   Procedure Laterality Date    HX APPENDECTOMY      HX GI      flopy colon    HX GYN      ov cyst hyst     HX ORTHOPAEDIC      ft foot reconst     Social History     Occupational History    Not on file.      Social History Main Topics    Smoking status: Current Every Day Smoker     Packs/day: 0.50    Smokeless tobacco: Never Used    Alcohol use Yes      Comment: social    Drug use: No    Sexual activity: Yes     Family History   Problem Relation Age of Onset    Cancer Mother     Alzheimer Mother     Cancer Father        REVIEW OF SYSTEMS : 12/5/2017  ALL BELOW ARE Negative except : SEE HPI       Constitutional: Negative for fever, chills and weight loss. Neg Weigh Loss  Cardiovascular: Negative for chest pain, claudication and leg swelling. SOB, ORTIZ   Gastrointestinal: Negative for  pain, N/V/D/C, Blood in stool or urine,dysuria, hematuria,        Incontinence, pelvic pain  Musculoskeletal: see HPI. Neurological: Negative for dizziness and weakness. Negative for headaches,Visual Changes, Confusion, Seizures,   Psychiatric/Behavioral: Negative for depression, memory loss and substance abuse. Extremities:  Negative for  hair changes, rash or skin lesion changes. Hematologic: Negative for Bleeding problems, bruising, pallor or swollen lymph nodes. Peripheral Vascular: No calf pain, vascular vein tenderness to calf pain              No calf throbbing, posterior knee throbbing pain    DIAGNOSTIC IMAGING     No notes on file    Written by Yi Moreira, as dictated by Cary Cruz MD. I, , Cary Cruz MD, confirm that all documentation is accurate.

## 2018-03-07 ENCOUNTER — HOSPITAL ENCOUNTER (EMERGENCY)
Age: 63
Discharge: HOME | End: 2018-03-07
Attending: EMERGENCY MEDICINE
Payer: COMMERCIAL

## 2018-03-07 ENCOUNTER — APPOINTMENT (OUTPATIENT)
Dept: GENERAL RADIOLOGY | Age: 63
End: 2018-03-07
Attending: EMERGENCY MEDICINE
Payer: COMMERCIAL

## 2018-03-07 VITALS
WEIGHT: 170 LBS | SYSTOLIC BLOOD PRESSURE: 152 MMHG | HEART RATE: 84 BPM | DIASTOLIC BLOOD PRESSURE: 84 MMHG | OXYGEN SATURATION: 100 % | BODY MASS INDEX: 33.38 KG/M2 | RESPIRATION RATE: 16 BRPM | TEMPERATURE: 98 F | HEIGHT: 60 IN

## 2018-03-07 DIAGNOSIS — S93.421A SPRAIN OF DELTOID LIGAMENT OF RIGHT ANKLE, INITIAL ENCOUNTER: Primary | ICD-10-CM

## 2018-03-07 PROCEDURE — 74011250637 HC RX REV CODE- 250/637: Performed by: EMERGENCY MEDICINE

## 2018-03-07 PROCEDURE — 73610 X-RAY EXAM OF ANKLE: CPT

## 2018-03-07 PROCEDURE — 99284 EMERGENCY DEPT VISIT MOD MDM: CPT

## 2018-03-07 RX ORDER — IBUPROFEN 400 MG/1
800 TABLET ORAL
Status: COMPLETED | OUTPATIENT
Start: 2018-03-07 | End: 2018-03-07

## 2018-03-07 RX ORDER — IBUPROFEN 800 MG/1
TABLET ORAL
Qty: 20 TAB | Refills: 0 | Status: SHIPPED | OUTPATIENT
Start: 2018-03-07

## 2018-03-07 RX ADMIN — IBUPROFEN 800 MG: 400 TABLET ORAL at 10:30

## 2018-03-07 NOTE — DISCHARGE INSTRUCTIONS
Ankle Sprain: Care Instructions  Your Care Instructions    An ankle sprain can happen when you twist your ankle. The ligaments that support the ankle can get stretched and torn. Often the ankle is swollen and painful. Ankle sprains may take from several weeks to several months to heal. Usually, the more pain and swelling you have, the more severe your ankle sprain is and the longer it will take to heal. You can heal faster and regain strength in your ankle with good home treatment. It is very important to give your ankle time to heal completely, so that you do not easily hurt your ankle again. Follow-up care is a key part of your treatment and safety. Be sure to make and go to all appointments, and call your doctor if you are having problems. It's also a good idea to know your test results and keep a list of the medicines you take. How can you care for yourself at home? · Prop up your foot on pillows as much as possible for the next 3 days. Try to keep your ankle above the level of your heart. This will help reduce the swelling. · Follow your doctor's directions for wearing a splint or elastic bandage. Wrapping the ankle may help reduce or prevent swelling. · Your doctor may give you a splint, a brace, an air stirrup, or another form of ankle support to protect your ankle until it is healed. Wear it as directed while your ankle is healing. Do not remove it unless your doctor tells you to. After your ankle has healed, ask your doctor whether you should wear the brace when you exercise. · Put ice or cold packs on your injured ankle for 10 to 20 minutes at a time. Try to do this every 1 to 2 hours for the next 3 days (when you are awake) or until the swelling goes down. Put a thin cloth between the ice and your skin. · You may need to use crutches until you can walk without pain. If you do use crutches, try to bear some weight on your injured ankle if you can do so without pain.  This helps the ankle heal.  · Take pain medicines exactly as directed. ¨ If the doctor gave you a prescription medicine for pain, take it as prescribed. ¨ If you are not taking a prescription pain medicine, ask your doctor if you can take an over-the-counter medicine. · If you have been given ankle exercises to do at home, do them exactly as instructed. These can promote healing and help prevent lasting weakness. When should you call for help? Call your doctor now or seek immediate medical care if:  ? · Your pain is getting worse. ? · Your swelling is getting worse. ? · Your splint feels too tight or you are unable to loosen it. ? Watch closely for changes in your health, and be sure to contact your doctor if:  ? · You are not getting better after 1 week. Where can you learn more? Go to http://bud-ad.info/. Enter J531 in the search box to learn more about \"Ankle Sprain: Care Instructions. \"  Current as of: March 21, 2017  Content Version: 11.4  © 1214-1394 Healthwise, [employer]. Care instructions adapted under license by [employer] (which disclaims liability or warranty for this information). If you have questions about a medical condition or this instruction, always ask your healthcare professional. [address] any warranty or liability for your use of this information.

## 2018-03-07 NOTE — ED PROVIDER NOTES
EMERGENCY DEPARTMENT HISTORY AND PHYSICAL EXAM    9:02 AM      Date: 3/7/2018  Patient Name: Yong Mariscal    History of Presenting Illness     Chief Complaint   Patient presents with    Ankle Pain         History Provided By: Patient    Chief Complaint: Ankle Pain  Duration: Yesterday  Timing:  Constant and Progressive  Location: Right  Quality:   Severity: Moderate  Modifying Factors: right foot reconstruction, Denies fall/injury  Associated Symptoms: denies any other associated signs or symptoms      Additional History (Context): Yong Mariscal is a 63 y.o. female with hx of Chronic Kidney Disease, Right Foot Reconstruction, and Appendectomy who presents with c/o constant and progressive moderate right ankle pain onset yesterday. Pt states she drove to [facility] and then was walking in [place] when she first noticed her ankle did not feel baseline and then it progressed to where she could not bare weight on ankle. Denies fall or injury. Denies having similar sx. Denies any other associated symptoms.  at bedside. PCP: Gato Young MD        Past History     Past Medical History:  Past Medical History:   Diagnosis Date    Chronic kidney disease     kidney stones    Joint swelling        Past Surgical History:  Past Surgical History:   Procedure Laterality Date    HX APPENDECTOMY      HX BUNIONECTOMY      HX GI      flopy colon    HX GYN      ov cyst,  hysterectomy    HX ORTHOPAEDIC      right foot reconst       Family History:  Family History   Problem Relation Age of Onset    Cancer Mother     Alzheimer Mother     Cancer Father        Social History:  Social History   Substance Use Topics    Smoking status: Current Every Day Smoker     Packs/day: 0.50    Smokeless tobacco: Never Used    Alcohol use 1.2 - 1.8 oz/week     2 - 3 Shots of liquor per week      Comment: daily       Allergies:   Allergies   Allergen Reactions    Iodinated Contrast- Oral And Iv Dye Other (comments)         Review of Systems       Review of Systems   Constitutional: Negative for fever. Eyes: Negative for visual disturbance. Respiratory: Negative for shortness of breath. Cardiovascular: Negative for chest pain and leg swelling. Gastrointestinal: Negative for abdominal pain, blood in stool and vomiting. Genitourinary: Negative for dysuria and flank pain. Musculoskeletal: Positive for joint swelling (right ankle). Negative for arthralgias and neck pain. Right ankle pain   Skin: Negative for rash. Neurological: Negative for headaches. Hematological: Does not bruise/bleed easily. Psychiatric/Behavioral: Negative for confusion. All other systems reviewed and are negative. Physical Exam     Visit Vitals    /84 (BP 1 Location: Left arm, BP Patient Position: At rest)    Pulse 84    Temp 98 °F (36.7 °C)    Resp 16    Ht 5' (1.524 m)    Wt 77.1 kg (170 lb)    SpO2 100%    BMI 33.2 kg/m2         Physical Exam   Constitutional: She is oriented to person, place, and time. She appears well-developed and well-nourished. No distress. HENT:   Head: Normocephalic and atraumatic. Right Ear: External ear normal.   Left Ear: External ear normal.   Nose: Nose normal.   Mouth/Throat: Oropharynx is clear and moist.   Eyes: Conjunctivae and EOM are normal. Pupils are equal, round, and reactive to light. No scleral icterus. Neck: Normal range of motion. Neck supple. No JVD present. No tracheal deviation present. No thyromegaly present. Cardiovascular: Normal rate, regular rhythm, normal heart sounds and intact distal pulses. Exam reveals no gallop and no friction rub. No murmur heard. Pulmonary/Chest: Effort normal and breath sounds normal. She exhibits no tenderness. Abdominal: Soft. Bowel sounds are normal. She exhibits no distension. There is no tenderness. There is no rebound and no guarding. Musculoskeletal: Normal range of motion.  She exhibits no edema. Right ankle: She exhibits swelling (moderate to lateral right ankle). Tenderness (moderate to lateral right ankle). Right foot: There is no tenderness. No right foot pain or medial right ankle pain   Lymphadenopathy:     She has no cervical adenopathy. Neurological: She is alert and oriented to person, place, and time. No cranial nerve deficit. Coordination normal.   No sensory loss, Gait normal, Motor 5/5   Skin: Skin is warm and dry. Psychiatric: She has a normal mood and affect. Her behavior is normal. Judgment and thought content normal.   Nursing note and vitals reviewed. Diagnostic Study Results     Labs -  No results found for this or any previous visit (from the past 12 hour(s)). Radiologic Studies -   XR ANKLE RT MIN 3 V    (Results Pending)   XR Ankle Right: ED Provider Interpretation  - Mild soft tissue swelling. No fracture. 10:23 AM        Medical Decision Making   I am the first provider for this patient. I reviewed the vital signs, available nursing notes, past medical history, past surgical history, family history and social history. Vital Signs-Reviewed the patient's vital signs. Pulse Oximetry Analysis -  100% on room air (Interpretation) Normal    Cardiac Monitor:  Rate: 84 bpm  Rhythm:  Normal Sinus Rhythm       Records Reviewed: Nursing Notes (Time of Review: 9:02 AM)      Diagnosis     Clinical Impression: No diagnosis found. Disposition: Discharge    Follow-up Information     None           Patient's Medications   Start Taking    No medications on file   Continue Taking    No medications on file   These Medications have changed    No medications on file   Stop Taking    CIPROFLOXACIN HCL (CIPRO) 500 MG TABLET    Take one upon receiving the rx, then one at bedtime or 12 hours later    ERGOCALCIFEROL (VITAMIN D2) 50,000 UNIT CAPSULE    Take 50,000 Units by mouth. FUROSEMIDE (LASIX) 20 MG TABLET    Take 20 mg by mouth as needed.  Indications: EDEMA    KLOR-CON 10 10 MEQ TABLET        LIDOCAINE (LIDODERM) 5 %    Apply patch to the affected area for 12 hours a day and remove for 12 hours a day. MAGNESIUM OXIDE (MAGOX) 400 MG TABLET    Take 400 mg by mouth daily. OXYBUTYNIN CHLORIDE XL (DITROPAN XL) 5 MG CR TABLET    Take 1 Tab by mouth daily. OXYCODONE-ACETAMINOPHEN (PERCOCET) 5-325 MG PER TABLET    Take 1 Tab by mouth every four (4) hours as needed for Pain. TAMSULOSIN (FLOMAX) 0.4 MG CAPSULE    Take 1 Cap by mouth daily. TRAMADOL (ULTRAM) 50 MG TABLET    Take 50 mg by mouth every six (6) hours as needed for Pain. Indications: PAIN     _______________________________    Attestations:  Scribe Attestation     Joann Ceron acting as a scribe for and in the presence of Martina Kelly MD      March 07, 2018 at 9:02 AM       Provider Attestation:      I personally performed the services described in the documentation, reviewed the documentation, as recorded by the scribe in my presence, and it accurately and completely records my words and actions. March 07, 2018 at 9:02 AM - Martina Kelly MD    _______________________________    Results reviwed with pt, she agrees with dispo and F/U plan.   Martina Kelly MD  10:27 AM

## 2018-03-07 NOTE — ED TRIAGE NOTES
Pt co r ankle pain since yest states  Pain progressively worsened throughout the day, around 3 am was unable to bear weight and had increased in swelling

## 2018-06-05 ENCOUNTER — HOSPITAL ENCOUNTER (OUTPATIENT)
Dept: MAMMOGRAPHY | Age: 63
Discharge: HOME | End: 2018-06-05
Attending: FAMILY MEDICINE
Payer: COMMERCIAL

## 2018-06-05 ENCOUNTER — HOSPITAL ENCOUNTER (OUTPATIENT)
Dept: GENERAL RADIOLOGY | Age: 63
Discharge: HOME | End: 2018-06-05
Attending: FAMILY MEDICINE
Payer: COMMERCIAL

## 2018-06-05 DIAGNOSIS — Z12.31 VISIT FOR SCREENING MAMMOGRAM: ICD-10-CM

## 2018-06-05 DIAGNOSIS — M25.572 LEFT LATERAL ANKLE PAIN: ICD-10-CM

## 2018-06-05 PROCEDURE — 77063 BREAST TOMOSYNTHESIS BI: CPT

## 2018-06-05 PROCEDURE — 73610 X-RAY EXAM OF ANKLE: CPT

## 2019-06-10 ENCOUNTER — HOSPITAL ENCOUNTER (OUTPATIENT)
Dept: MAMMOGRAPHY | Age: 64
Discharge: HOME | End: 2019-06-10
Attending: FAMILY MEDICINE
Payer: COMMERCIAL

## 2019-06-10 DIAGNOSIS — Z12.39 BREAST SCREENING, UNSPECIFIED: ICD-10-CM

## 2019-06-10 PROCEDURE — 77067 SCR MAMMO BI INCL CAD: CPT

## 2020-10-01 ENCOUNTER — HOSPITAL ENCOUNTER (OUTPATIENT)
Dept: MAMMOGRAPHY | Age: 65
Discharge: HOME | End: 2020-10-01
Attending: FAMILY MEDICINE
Payer: COMMERCIAL

## 2020-10-01 DIAGNOSIS — Z12.31 VISIT FOR SCREENING MAMMOGRAM: ICD-10-CM

## 2020-10-01 PROCEDURE — 77063 BREAST TOMOSYNTHESIS BI: CPT

## 2021-09-29 ENCOUNTER — TRANSCRIBE ORDER (OUTPATIENT)
Dept: SCHEDULING | Age: 66
End: 2021-09-29

## 2021-09-29 DIAGNOSIS — M25.511 RIGHT SHOULDER PAIN: Primary | ICD-10-CM

## 2021-10-07 ENCOUNTER — HOSPITAL ENCOUNTER (OUTPATIENT)
Age: 66
Discharge: HOME | End: 2021-10-07
Attending: GENERAL PRACTICE
Payer: COMMERCIAL

## 2021-10-07 DIAGNOSIS — M25.511 RIGHT SHOULDER PAIN: ICD-10-CM

## 2021-10-07 PROCEDURE — 73221 MRI JOINT UPR EXTREM W/O DYE: CPT

## 2021-10-12 ENCOUNTER — OFFICE VISIT (OUTPATIENT)
Dept: ORTHOPEDIC SURGERY | Age: 66
End: 2021-10-12
Payer: COMMERCIAL

## 2021-10-12 VITALS
BODY MASS INDEX: 36.6 KG/M2 | OXYGEN SATURATION: 97 % | TEMPERATURE: 96.9 F | HEIGHT: 60 IN | WEIGHT: 186.4 LBS | HEART RATE: 81 BPM

## 2021-10-12 DIAGNOSIS — M75.121 NONTRAUMATIC COMPLETE TEAR OF RIGHT ROTATOR CUFF: Primary | ICD-10-CM

## 2021-10-12 PROCEDURE — 99203 OFFICE O/P NEW LOW 30 MIN: CPT | Performed by: ORTHOPAEDIC SURGERY

## 2021-10-12 NOTE — PROGRESS NOTES
HISTORY AND PHYSICAL              Patient: Janet Montiel     MRN: [id number] SSN: [id number]      YOB: 1955       AGE: 61 y.o. SEX: female        Patient scheduled for:  right arthroscopic rotator cuff repair   Surgeon: Jigna Subramanian MD     ANESTHESIA TYPE:  General     HISTORY:     The patient was seen in the office today for a preoperative history and physical for an upcoming above listed surgery. The patient is a pleasant 61 y.o. female who has a history of right shoulder pain. She rates her pain 3/10 today. She notes the pain started in her biceps region and has had intermittent pain in the biceps over the last year but it the shoulder worsened in the past 6 weeks. Pain with certain movements. Pt went to [facility] and they ordered a MRI. No injury. Patient describes the pain as sharp that is Intermittent in nature. Symptoms are worse with certain movements and is better with  avoid certain movements . Associated symptoms include Swelling. Since problem started, it: is unchanged. Pain does wake patient up at night. Has taken no meds for the problem. Has tried following treatments: Injections:NO; Brace:NO; Therapy:NO; Cane/Crutch:NO      Due to the current findings, affected activity of daily living and continued pain and discomfort, surgical intervention is indicated. The alternatives, risks, and complications, including but not limited to infection, blood loss, need for blood transfusion, neurovascular damage, shaggy-incisional numbness, subcutaneous hematoma, bone fracture, anesthetic complications, DVT, PE, death, RSD, postoperative stiffness and pain, possible surgical scar, delayed healing and nonhealing, reflexive sympathetic dystrophy, damage to blood vessels and nerves, need for more surgery, MI, and stroke,  failure of hardware, gait disturbances,have been discussed. The patient understands and wishes to proceed with surgery.     PAST MEDICAL HISTORY:      Past Medical History:   Diagnosis Date    Chronic kidney disease     kidney stones    Joint swelling     Right shoulder pain        CURRENT MEDICATIONS:     Current Outpatient Medications on File Prior to Visit   Medication Sig Dispense Refill    ibuprofen (MOTRIN) 800 mg tablet 1 tab q 6-8 hours PRN pain (Patient not taking: Reported on 10/12/2021) 20 Tab 0     No current facility-administered medications on file prior to visit. ALLERGIES:     Allergies   Allergen Reactions    Iodinated Contrast Media Other (comments)        SURGICAL HISTORY:       Past Surgical History:   Procedure Laterality Date    HX APPENDECTOMY      HX BUNIONECTOMY      HX GI      flopy colon    HX GYN      ov cyst,  hysterectomy    HX ORTHOPAEDIC      right foot reconst        FAMILY HISTORY:    History reviewed. No pertinent family history. REVIEW OF SYSTEMS:      Negative for fevers, chills, chest pain, shortness of breath, weight loss, recent illness      General: Negative for fever and chills. No unexpected change in weight. Denies fatigue. No change in appetite. Skin: Negative for rash or itching. HEENT: Negative for congestion, sore throat, neck pain and neck stiffness. No change in vision or hearing. Hasn't noted any enlarged lymph nodes in the neck. Cardiovascular:  Negative for chest pain and palpitations. Has not noted pedal edema. Respiratory: Negative for cough, colds, sinus, hemoptysis, shortness of breath and wheezing. Gastrointestinal: Negative for nausea and vomiting, rectal bleeding, coffee ground emesis, abdominal pain, diarrhea and constipation. Genitourinary: Negative for dysuria, frequency urgency, or burning on micturition. No flank pain, no foul smelling urine, no difficulty with initiating urination. Hematological: Negative for bleeding or easy bruising. Musculoskeletal: Negative  for arthralgias, back pain or neck pain. Neurological: Negative for dizziness, seizures or syncopal episodes. Denies headaches. Endocrine: Denies excessive thirst.  No heat/cold intolerance. Psychiatric: Negative for depression or insomnia. PHYSICAL EXAMINATION:      VITALS:   Visit Vitals  Pulse 81   Temp 96.9 °F (36.1 °C) (Skin)   Ht 5' (1.524 m)   Wt 186 lb 6.4 oz (84.6 kg)   SpO2 97%   BMI 36.40 kg/m²        GEN:  Well developed, well nourished 61 y.o. @ in no acute distress. HEENT: Normocephalic and atraumatic. Eyes: Conjunctivae and EOM are normal.Pupils are equal, round, and reactive to light. External ear normal appearance, external nose normal appearing. Mouth/Throat: Oropharynx is clear and moist, able to handle oral secretions w/out difficulty, airway patent  NECK: Supple. Normal ROM, No lymphadenopathy. Trachea is midline. No bruising, swelling or deformity  RESP: Clear to auscultation bilaterally. No wheezes, rales, rhonchi. Normal effort and breath sounds. No respiratory distress  CARDIO:  Normal rate, regular rhythm and normal heart sounds. No MGR. ABDOMEN: Soft, non-tender, non-distended, normoactive bowel sounds in all four quadrants. There is no tenderness. There is no rebound and no guarding. BACK: No CVA or spinal tenderness  BREAST:  Deferred  PELVIC:    Deferred   RECTAL:  Deferred   :           Deferred  EXTREMITIES: EXAMINATION OF: right shoulder     Examination Right shoulder   Skin Intact   AC joint tenderness -   Biceps tenderness -   Forward flexion/Elevation    Active abduction    Glenohumeral abduction 90   External rotation ROM 45   Internal rotation ROM 30   Apprehension -   Rosanas Relocation -   Jerk -   Load and Shift -   Obriens -   Speeds -   Impingement sign +   Supraspinatus/Empty Can +   External Rotation Strength -, 5/5   Lift Off/Belly Press -, 5/5   Neurovascular Intact        NEUROVASCULAR: Sensation intact to light touch and strength grossly intact and symmetrical. No nystagmus. Positive distal pulses and capillary refill.    DVT ASSESSMENT:  There is not  calf tenderness. No evidence of DVT seen on physical exam.  MOTOR: In tact  PSYCH: Alert an oriented to person, place and time. Mood, memory, affect, behavior and judgment normal        RADIOGRAPHS & DIAGNOSTIC STUDIES:      MRI of the right shoulder dated 10/7/2021 was reviewed and read by Dr. Chao Wang:   [place]  Full-thickness rotator cuff tear involving supraspinatus, infraspinatus and  subscapularis tendons measuring 3.3 cm in the anterior posterior dimension with  a 3 cm of retraction. Muscular atrophy as discussed. Associated communicating  fluid between the subacromial subdeltoid bursa and glenohumeral joint with  evidence of bursitis and synovitis.     Long head biceps tendinopathy.     Inflammatory arthropathy AC joint. PLAN:      I discussed the risks and benefits and potential adverse outcomes of both operative vs non operative treatment of right shoulder rotator cuff tear with the patient and patient wishes to proceed with arthroscopic right rotator cuff repair. Risks of operative intervention include but not limited to bleeding, infection, deep vein thrombosis, pulmonary embolism, death, limb length discrepancy, reflexive sympathetic dystrophy, fat embolism syndrome,damage to blood vessels and nerves, malunion, non-union, delayed union, failure of hardware, post traumatic arthritis, stroke, heart attack, and death. Patient understands that infection may arise and may require numerous surgeries. The patient was counseled at length about the risks of zaid Covid-19 during their perioperative period and any recovery window from their procedure. The patient was made aware that zaid Covid-19  may worsen their prognosis for recovering from their procedure and lend to a higher morbidity and/or mortality risk. All material risks, benefits, and reasonable alternatives including postponing the procedure were discussed.  The patient does  wish to proceed with the procedure at this time. Risk factors include: BMI>35  2. No ultrasound exam indicated today  3. No cortisone injection indicated today   4. No Physical/Occupational Therapy indicated today  5. No diagnostic test indicated today:   6. No durable medical equipment indicated today  7. No referral indicated today   8. No medications indicated today:   9. No Narcotic indicated today        Scribed by Dariela Cole [facility]) as dictated by Vona Pallas, MD    I, Dr. Vona Pallas, confirm that all documentation is accurate.     Vona Pallas, M.D.   [facility]

## 2021-10-25 DIAGNOSIS — Z01.818 PREOP EXAMINATION: ICD-10-CM

## 2021-10-25 DIAGNOSIS — M75.121 NONTRAUMATIC COMPLETE TEAR OF RIGHT ROTATOR CUFF: Primary | ICD-10-CM

## 2021-10-27 ENCOUNTER — HOSPITAL ENCOUNTER (OUTPATIENT)
Dept: LAB | Age: 66
Discharge: HOME | End: 2021-10-27
Payer: COMMERCIAL

## 2021-10-27 ENCOUNTER — HOSPITAL ENCOUNTER (OUTPATIENT)
Dept: LAB | Age: 66
Discharge: HOME | End: 2021-10-27

## 2021-10-27 DIAGNOSIS — Z01.818 PREOP EXAMINATION: ICD-10-CM

## 2021-10-27 LAB
ATRIAL RATE: 81 BPM
CALCULATED P AXIS, ECG09: 38 DEGREES
CALCULATED R AXIS, ECG10: 18 DEGREES
CALCULATED T AXIS, ECG11: 34 DEGREES
DIAGNOSIS, 93000: NORMAL
P-R INTERVAL, ECG05: 120 MS
Q-T INTERVAL, ECG07: 378 MS
QRS DURATION, ECG06: 76 MS
QTC CALCULATION (BEZET), ECG08: 439 MS
VENTRICULAR RATE, ECG03: 81 BPM
XX-LABCORP SPECIMEN COL,LCBCF: NORMAL

## 2021-10-27 PROCEDURE — 99001 SPECIMEN HANDLING PT-LAB: CPT

## 2021-10-27 PROCEDURE — 93005 ELECTROCARDIOGRAM TRACING: CPT

## 2021-10-28 ENCOUNTER — OFFICE VISIT (OUTPATIENT)
Dept: ORTHOPEDIC SURGERY | Age: 66
End: 2021-10-28

## 2021-10-28 VITALS
OXYGEN SATURATION: 92 % | TEMPERATURE: 97.3 F | BODY MASS INDEX: 37.29 KG/M2 | DIASTOLIC BLOOD PRESSURE: 73 MMHG | HEIGHT: 59 IN | HEART RATE: 90 BPM | SYSTOLIC BLOOD PRESSURE: 139 MMHG | WEIGHT: 185 LBS

## 2021-10-28 DIAGNOSIS — G89.18 ACUTE POSTOPERATIVE PAIN: Primary | ICD-10-CM

## 2021-10-28 DIAGNOSIS — Z01.818 PREOP EXAMINATION: Primary | ICD-10-CM

## 2021-10-28 RX ORDER — PREGABALIN 25 MG/1
75 CAPSULE ORAL
Status: CANCELLED | OUTPATIENT
Start: 2021-11-03 | End: 2021-11-04

## 2021-10-28 RX ORDER — ACETAMINOPHEN 325 MG/1
500 TABLET ORAL
Status: CANCELLED | OUTPATIENT
Start: 2021-11-03 | End: 2021-11-04

## 2021-10-28 RX ORDER — OXYCODONE AND ACETAMINOPHEN 5; 325 MG/1; MG/1
1 TABLET ORAL
Qty: 42 TABLET | Refills: 0 | Status: SHIPPED | OUTPATIENT
Start: 2021-10-28 | End: 2021-11-11

## 2021-10-28 RX ORDER — CELECOXIB 100 MG/1
200 CAPSULE ORAL
Status: CANCELLED | OUTPATIENT
Start: 2021-11-03 | End: 2021-11-04

## 2021-10-28 NOTE — H&P
Patient: Panfilo Cintron                MRN: [id number]       SSN: [id number]  YOB: 1955        AGE: 61 y.o. SEX: female          PCP: Madeleine Gonsales MD  10/28/21    Chief Complaint   Patient presents with    Shoulder Pain     right shoulder H and P       HISTORY:  Panfilo Cintron is a 61 y.o. female presents to the office for history and physical in preparation for right shoulder arthroscopic exploration with probable rotator cuff repair. MRI as below:  Result Information    Status: Final result (Exam End: 10/7/2021 17:01) Provider Status: Open   Study Result    Narrative & Impression   History: Pain.     COMPARISON: None.     TECHNIQUE: Multiplanar, multisequence MRI images of the right shoulder were  obtained without contrast.     FINDINGS:     Full-thickness rotator cuff tear present centered at the supraspinatus with  involvement of the interspace and subscapularis tendons. The tear measures  approximately 3.3 cm in the anterior posterior dimension and there is  approximately 3 cm of retraction. Associated communicating fluid between the  glenohumeral joint and subacromial subdeltoid bursa with extensive synovial  proliferation and bursal irregularity. Fatty muscular infiltration of the  infraspinatus and subscapularis with mild/moderate muscular atrophy of the  supraspinatus. Teres minor intact.     AC joint demonstrates marginal osteophytes with some subchondral edema and  inflammation.     Mild diffuse labral degeneration. Mild glenohumeral cartilage thinning.     Mild tendinopathy of the intra-articular long head biceps tendon.  The  extra-articular is intact but there is complex fluid with sheath.     Enthesopathy and cystic resorption noted at humeral head attachment of the  rotator cuff.     No fracture.     IMPRESSION     Full-thickness rotator cuff tear involving supraspinatus, infraspinatus and  subscapularis tendons measuring 3.3 cm in the anterior posterior dimension with  a 3 cm of retraction. Muscular atrophy as discussed. Associated communicating  fluid between the subacromial subdeltoid bursa and glenohumeral joint with  evidence of bursitis and synovitis.     Long head biceps tendinopathy.     Inflammatory arthropathy AC joint. Pain Assessment  10/28/2021   Location of Pain Shoulder   Pain Location Comment -   Location Modifiers Right   Severity of Pain 7   Quality of Pain Aching; Sharp   Duration of Pain -   Frequency of Pain Constant   Aggravating Factors -   Aggravating Factors Comment -   Limiting Behavior -   Relieving Factors -   Relieving Factors Comment -   Result of Injury -           No results found for: HBA1C, EPE3QOAB, WTQ4XQHY  Weight Metrics 10/28/2021 10/12/2021 3/7/2018 12/5/2017 11/27/2017 9/26/2017 9/1/2017   Weight 185 lb 186 lb 6.4 oz 170 lb 180 lb 6.4 oz 181 lb 9.6 oz 182 lb 181 lb 3.2 oz   BMI 37.37 kg/m2 36.4 kg/m2 33.2 kg/m2 35.23 kg/m2 35.47 kg/m2 35.54 kg/m2 35.39 kg/m2            Problem List Items Addressed This Visit     None      Visit Diagnoses     Acute postoperative pain    -  Primary    Relevant Medications    oxyCODONE-acetaminophen (PERCOCET) 5-325 mg per tablet          PAST MEDICAL HISTORY:   Past Medical History:   Diagnosis Date    Chronic kidney disease     kidney stones    Joint swelling     Right shoulder pain        PAST SURGICAL HISTORY:   Past Surgical History:   Procedure Laterality Date    HX APPENDECTOMY      HX BUNIONECTOMY      HX GI      flopy colon    HX GYN      ov cyst,  hysterectomy    HX ORTHOPAEDIC      right foot reconst       ALLERGIES:   Allergies   Allergen Reactions    Iodinated Contrast Media Other (comments)        CURRENT MEDICATIONS:  A list of medications prior to the time of admission include:  Prior to Admission medications    Medication Sig Start Date End Date Taking?  Authorizing Provider   oxyCODONE-acetaminophen (PERCOCET) 5-325 mg per tablet Take 1 Tablet by mouth every four (4) hours as needed for Pain for up to 14 days. Max Daily Amount: 6 Tablets. 10/28/21 11/11/21 Yes Jeannine Malloy PA-C   ibuprofen (MOTRIN) 800 mg tablet 1 tab q 6-8 hours PRN pain  Patient not taking: Reported on 10/12/2021 3/7/18   Enrique Sanabria MD       FAMILY HISTORY:   Family History   Problem Relation Age of Onset    Cancer Mother     Alzheimer Mother     Cancer Father        SOCIAL HISTORY:   Social History     Socioeconomic History    Marital status:      Spouse name: Not on file    Number of children: Not on file    Years of education: Not on file    Highest education level: Not on file   Tobacco Use    Smoking status: Current Every Day Smoker     Packs/day: 0.50    Smokeless tobacco: Never Used   Substance and Sexual Activity    Alcohol use: Yes     Alcohol/week: 2.0 - 3.0 standard drinks     Types: 2 - 3 Shots of liquor per week     Comment: daily    Drug use: No    Sexual activity: Yes     Social Determinants of Health     Financial Resource Strain:     Difficulty of Paying Living Expenses:    Food Insecurity:     Worried About Running Out of Food in the Last Year:     Ran Out of Food in the Last Year:    Transportation Needs:     Lack of Transportation (Medical):  Lack of Transportation (Non-Medical):    Physical Activity:     Days of Exercise per Week:     Minutes of Exercise per Session:    Stress:     Feeling of Stress :    Social Connections:     Frequency of Communication with Friends and Family:     Frequency of Social Gatherings with Friends and Family:     Attends Hoahaoism Services:     Active Member of Clubs or Organizations:     Attends Club or Organization Meetings:     Marital Status:        ROS:No CP, No SOB, No fever/chills nor night sweats. No headaches, vision abnormalities to include double and oral loss of vision. No hearing abnormalities. Musculoskeletal pain per HPI.  Pain is exacerbated positionally. Pt denies h/o spinal surgery, injections, or PT/chiropractor. Self treated with less than adequate relief on oral antiinflammatories. . Pt denies change in bowel or bladder habits. Pt denies fever, weight loss, or skin changes. PHYSICAL EXAM:    Visit Vitals  /73 (BP 1 Location: Left upper arm, BP Patient Position: Sitting, BP Cuff Size: Large adult)   Pulse 90   Temp 97.3 °F (36.3 °C) (Temporal)   Ht 4' 11\" (1.499 m)   Wt 185 lb (83.9 kg)   SpO2 92%   BMI 37.37 kg/m²       Constitutional: Appears well-developed and well-nourished. No distress. Sitting comfortably in the exam room, interacting with conversation with pleasant affect. Gait is steady and patient exhibits no evidence of ataxia. Patient is able to ambulate without difficulty. No focal neurological deficit noted. No facial droop, slurred speech, or evidence of altered mentation noted on exam.   Skin: Skin over the head, neck, bilateral limbs, and trunk is warm and dry. No rash or erythema noted. Cranial Nerves II-XII grossly intact  HENT: NC/AT. Normal symmetry, bulk and tone of facial and neck musculature. Trachea midline. No discernible thyromegaly or masses. No involuntary movements. Lymphatic: No preauricular, submandibuar, anterior or posterior cervical lymphadenopathy. Psychiatric: The patient is awake, alert, and oriented to person, place and time. Behavior is normal. Thought content normal.   Cardiovascular: No clubbing, cyanosis. No edema bilateral lower extremities. Pulmonary: No tripoding nor accessory muscle recruitment. Breathing normally, no distress, no audible wheezing. Distal cap refill intact at 2/2 Darrel UE / LE. Neuro intact Darrel UE/LE to noxious stimuli        Ortho Specific exam:    Right shoulder pain over the distal AC joint. Active forward flexion guarded today with pain throughout to 100 degrees.     Active internal rotation posterior aspect of the right iliac crest with pain and guarding. Passive external rotation 25 degrees with pain throughout. Distal sensation intact fully right upper extremity. Right elbow range of motion passive 110 degrees -5 degrees. No pain or guarding noted. IMPRESSION:      ICD-10-CM ICD-9-CM    1. Acute postoperative pain  G89.18 338.18 oxyCODONE-acetaminophen (PERCOCET) 5-325 mg per tablet   2. Right shoulder internal derangement per MRI as above    PLAN: Per Dr. Melissa Hager recommending right shoulder arthroscopy with probable arthroscopic rotator cuff repair. Risk / Benefit: Surgeon will discuss in \"face to face\" encounter on day of surgery. Family History and Surgical History reviewed, discussed in detail, and deemed Non-Contributory in preparation for this surgical encounter. Patient provided a reminder for a \"due or due soon\" health maintenance. I have asked the patient to schedule an appointment with their primary care provider for follow-up on general health maintenance concerns. Today all the patient's questions were answered to their satisfaction. Copies of x-rays reviewed if obtained this visit, and provided to patient. Dictation disclaimer:  Please note that this dictation was completed with [employer], the [employer] voice recognition software. Quite often unanticipated grammatical, syntax, homophones, and other interpretive errors are inadvertently transcribed by the computer software. Please disregard these errors. Please excuse any errors that have escaped final proofreading. Rasheed AVALOS, APC, MPAS, PA-C  [facility]

## 2021-10-29 ENCOUNTER — HOSPITAL ENCOUNTER (OUTPATIENT)
Dept: PREADMISSION TESTING | Age: 66
Discharge: HOME | End: 2021-10-29
Payer: COMMERCIAL

## 2021-10-29 DIAGNOSIS — Z01.818 PREOP EXAMINATION: ICD-10-CM

## 2021-10-29 LAB — SARS-COV-2, NAA: NOT DETECTED

## 2021-10-29 PROCEDURE — U0003 INFECTIOUS AGENT DETECTION BY NUCLEIC ACID (DNA OR RNA); SEVERE ACUTE RESPIRATORY SYNDROME CORONAVIRUS 2 (SARS-COV-2) (CORONAVIRUS DISEASE [COVID-19]), AMPLIFIED PROBE TECHNIQUE, MAKING USE OF HIGH THROUGHPUT TECHNOLOGIES AS DESCRIBED BY CMS-2020-01-R: HCPCS

## 2021-11-02 ENCOUNTER — ANESTHESIA EVENT (OUTPATIENT)
Dept: SURGERY | Age: 66
End: 2021-11-02
Payer: COMMERCIAL

## 2021-11-02 NOTE — PERIOP NOTES
PRE-SURGICAL INSTRUCTIONS        Patient's Name:  Michael Uribe      QRGDG'H Date:  11/2/2021            Covid Testing Date and Time:    Surgery Date:  11/3/2021                1. Do NOT eat or drink anything, including candy, gum, or ice chips after midnight on 11/2/2021, unless you have specific instructions from your surgeon or anesthesia provider to do so.  2. You may brush your teeth before coming to the hospital.  3. No smoking 24 hours prior to the day of surgery. 4. No alcohol 24 hours prior to the day of surgery. 5. No recreational drugs for one week prior to the day of surgery. 6. Leave all valuables, including money/purse, at home. 7. Remove all jewelry, nail polish, acrylic nails, and makeup (including mascara); no lotions powders, deodorant, or perfume/cologne/after shave on the skin. 8. Follow instruction for Hibiclens washes and CHG wipes from surgeon's office. 9. Glasses/contact lenses and dentures may be worn to the hospital.  They will be removed prior to surgery. 10. Call your doctor if symptoms of a cold or illness develop within 24-48 hours prior to your surgery. 11.  If you are having an outpatient procedure, please make arrangements for a responsible ADULT TO [address] [place] [address] and stay with you for 24 hours after your surgery. 12. ONE VISITOR in the hospital at this time for outpatient procedures. Exceptions may be made for surgical admissions, per nursing unit guidelines      Special Instructions:      Bring list of CURRENT medications. Bring inhaler. Bring CPAP machine. Bring any pertinent legal medical records. Take these medications the morning of surgery with a sip of water:  none  Follow physician instructions about insulin. Follow physician instructions about stopping anticoagulants. Complete bowel prep per MD instructions. On the day of surgery, come in the main entrance of [facility].   Let the  at the desk know you are there for surgery. A staff member will come escort you to the surgical area on the second floor. If you have any questions or concerns, please do not hesitate to call:     (Prior to the day of surgery) PAT department:  [phone]   (Day of surgery) Pre-Op department:  [phone]    These surgical instructions were reviewed with patient during the PAT phone call.

## 2021-11-03 ENCOUNTER — HOSPITAL ENCOUNTER (OUTPATIENT)
Age: 66
Setting detail: OUTPATIENT SURGERY
Discharge: HOME | End: 2021-11-03
Attending: ORTHOPAEDIC SURGERY | Admitting: ORTHOPAEDIC SURGERY
Payer: COMMERCIAL

## 2021-11-03 ENCOUNTER — ANESTHESIA (OUTPATIENT)
Dept: SURGERY | Age: 66
End: 2021-11-03
Payer: COMMERCIAL

## 2021-11-03 VITALS
OXYGEN SATURATION: 94 % | BODY MASS INDEX: 36.12 KG/M2 | WEIGHT: 184 LBS | TEMPERATURE: 97.2 F | RESPIRATION RATE: 20 BRPM | HEART RATE: 77 BPM | SYSTOLIC BLOOD PRESSURE: 129 MMHG | DIASTOLIC BLOOD PRESSURE: 74 MMHG | HEIGHT: 60 IN

## 2021-11-03 PROCEDURE — 77030002922 HC SUT FBRWRE ARTH -B: Performed by: ORTHOPAEDIC SURGERY

## 2021-11-03 PROCEDURE — 77030002933 HC SUT MCRYL J&J -A: Performed by: ORTHOPAEDIC SURGERY

## 2021-11-03 PROCEDURE — 77030008683 HC TU ET CUF COVD -A: Performed by: ANESTHESIOLOGY

## 2021-11-03 PROCEDURE — 76010000131 HC OR TIME 2 TO 2.5 HR: Performed by: ORTHOPAEDIC SURGERY

## 2021-11-03 PROCEDURE — 77030013079 HC BLNKT BAIR HGGR 3M -A: Performed by: ANESTHESIOLOGY

## 2021-11-03 PROCEDURE — 74011000250 HC RX REV CODE- 250: Performed by: PHYSICIAN ASSISTANT

## 2021-11-03 PROCEDURE — 77030003598 HC NDL MULT/FIRE ARTH -C: Performed by: ORTHOPAEDIC SURGERY

## 2021-11-03 PROCEDURE — 77030026438 HC STYL ET INTUB CARD -A: Performed by: ANESTHESIOLOGY

## 2021-11-03 PROCEDURE — 77030040361 HC SLV COMPR DVT MDII -B: Performed by: ORTHOPAEDIC SURGERY

## 2021-11-03 PROCEDURE — 74011250636 HC RX REV CODE- 250/636: Performed by: NURSE ANESTHETIST, CERTIFIED REGISTERED

## 2021-11-03 PROCEDURE — 76060000035 HC ANESTHESIA 2 TO 2.5 HR: Performed by: ORTHOPAEDIC SURGERY

## 2021-11-03 PROCEDURE — 77030010427: Performed by: ORTHOPAEDIC SURGERY

## 2021-11-03 PROCEDURE — 2709999900 HC NON-CHARGEABLE SUPPLY: Performed by: ORTHOPAEDIC SURGERY

## 2021-11-03 PROCEDURE — 77030040922 HC BLNKT HYPOTHRM STRY -A: Performed by: ORTHOPAEDIC SURGERY

## 2021-11-03 PROCEDURE — C1713 ANCHOR/SCREW BN/BN,TIS/BN: HCPCS | Performed by: ORTHOPAEDIC SURGERY

## 2021-11-03 PROCEDURE — 77030002966 HC SUT PDS J&J -A: Performed by: ORTHOPAEDIC SURGERY

## 2021-11-03 PROCEDURE — 77030019605: Performed by: ORTHOPAEDIC SURGERY

## 2021-11-03 PROCEDURE — 77030003666 HC NDL SPINAL BD -A: Performed by: ORTHOPAEDIC SURGERY

## 2021-11-03 PROCEDURE — 29827 SHO ARTHRS SRG RT8TR CUF RPR: CPT | Performed by: ORTHOPAEDIC SURGERY

## 2021-11-03 PROCEDURE — 74011250637 HC RX REV CODE- 250/637: Performed by: PHYSICIAN ASSISTANT

## 2021-11-03 PROCEDURE — 77030004453 HC BUR SHV STRY -B: Performed by: ORTHOPAEDIC SURGERY

## 2021-11-03 PROCEDURE — 29826 SHO ARTHRS SRG DECOMPRESSION: CPT | Performed by: ORTHOPAEDIC SURGERY

## 2021-11-03 PROCEDURE — 76210000021 HC REC RM PH II 0.5 TO 1 HR: Performed by: ORTHOPAEDIC SURGERY

## 2021-11-03 PROCEDURE — 77030031139 HC SUT VCRL2 J&J -A: Performed by: ORTHOPAEDIC SURGERY

## 2021-11-03 PROCEDURE — 77030039266 HC ADH SKN EXOFIN S2SG -A: Performed by: ORTHOPAEDIC SURGERY

## 2021-11-03 PROCEDURE — 01630 ANES OPN/ARTHR PX SHO JT NOS: CPT | Performed by: ANESTHESIOLOGY

## 2021-11-03 PROCEDURE — 74011000250 HC RX REV CODE- 250: Performed by: NURSE ANESTHETIST, CERTIFIED REGISTERED

## 2021-11-03 PROCEDURE — 74011250636 HC RX REV CODE- 250/636: Performed by: ANESTHESIOLOGY

## 2021-11-03 PROCEDURE — 01630 ANES OPN/ARTHR PX SHO JT NOS: CPT | Performed by: NURSE ANESTHETIST, CERTIFIED REGISTERED

## 2021-11-03 PROCEDURE — 76210000006 HC OR PH I REC 0.5 TO 1 HR: Performed by: ORTHOPAEDIC SURGERY

## 2021-11-03 PROCEDURE — 76942 ECHO GUIDE FOR BIOPSY: CPT | Performed by: ANESTHESIOLOGY

## 2021-11-03 PROCEDURE — 77030033005 HC TBNG ARTHSC PMP STRY -B: Performed by: ORTHOPAEDIC SURGERY

## 2021-11-03 PROCEDURE — 77030006891 HC BLD SHV RESECT STRY -B: Performed by: ORTHOPAEDIC SURGERY

## 2021-11-03 PROCEDURE — 64450 NJX AA&/STRD OTHER PN/BRANCH: CPT | Performed by: ANESTHESIOLOGY

## 2021-11-03 PROCEDURE — 74011250637 HC RX REV CODE- 250/637: Performed by: NURSE ANESTHETIST, CERTIFIED REGISTERED

## 2021-11-03 PROCEDURE — 74011250636 HC RX REV CODE- 250/636: Performed by: ORTHOPAEDIC SURGERY

## 2021-11-03 PROCEDURE — 77030017964 HC PRB COAG4 STRY -C: Performed by: ORTHOPAEDIC SURGERY

## 2021-11-03 PROCEDURE — 77030002919 HC SUT FBRTAPE ARTH -B: Performed by: ORTHOPAEDIC SURGERY

## 2021-11-03 PROCEDURE — 74011250636 HC RX REV CODE- 250/636: Performed by: PHYSICIAN ASSISTANT

## 2021-11-03 DEVICE — ANCHOR SUT L14.7MM DIA5.5MM BIOCOMPOSITE FULL THRD W/ 1.3MM: Type: IMPLANTABLE DEVICE | Site: SHOULDER | Status: FUNCTIONAL

## 2021-11-03 DEVICE — ANCHOR SUTURE BIOCOMP 4.75X19.1 MM SWIVELOCK C: Type: IMPLANTABLE DEVICE | Site: SHOULDER | Status: FUNCTIONAL

## 2021-11-03 RX ORDER — LIDOCAINE HYDROCHLORIDE 10 MG/ML
3 INJECTION, SOLUTION EPIDURAL; INFILTRATION; INTRACAUDAL; PERINEURAL ONCE
Status: DISCONTINUED | OUTPATIENT
Start: 2021-11-03 | End: 2021-11-03 | Stop reason: HOSPADM

## 2021-11-03 RX ORDER — FAMOTIDINE 20 MG/1
20 TABLET, FILM COATED ORAL ONCE
Status: COMPLETED | OUTPATIENT
Start: 2021-11-03 | End: 2021-11-03

## 2021-11-03 RX ORDER — ROPIVACAINE HYDROCHLORIDE 5 MG/ML
INJECTION, SOLUTION EPIDURAL; INFILTRATION; PERINEURAL
Status: COMPLETED | OUTPATIENT
Start: 2021-11-03 | End: 2021-11-03

## 2021-11-03 RX ORDER — HYDROMORPHONE HYDROCHLORIDE 1 MG/ML
0.25 INJECTION, SOLUTION INTRAMUSCULAR; INTRAVENOUS; SUBCUTANEOUS AS NEEDED
Status: DISCONTINUED | OUTPATIENT
Start: 2021-11-03 | End: 2021-11-03 | Stop reason: HOSPADM

## 2021-11-03 RX ORDER — CELECOXIB 100 MG/1
200 CAPSULE ORAL
Status: COMPLETED | OUTPATIENT
Start: 2021-11-03 | End: 2021-11-03

## 2021-11-03 RX ORDER — ROCURONIUM BROMIDE 10 MG/ML
INJECTION, SOLUTION INTRAVENOUS AS NEEDED
Status: DISCONTINUED | OUTPATIENT
Start: 2021-11-03 | End: 2021-11-03 | Stop reason: HOSPADM

## 2021-11-03 RX ORDER — FENTANYL CITRATE 50 UG/ML
50 INJECTION, SOLUTION INTRAMUSCULAR; INTRAVENOUS
Status: DISCONTINUED | OUTPATIENT
Start: 2021-11-03 | End: 2021-11-03 | Stop reason: HOSPADM

## 2021-11-03 RX ORDER — GLYCOPYRROLATE 0.2 MG/ML
INJECTION INTRAMUSCULAR; INTRAVENOUS AS NEEDED
Status: DISCONTINUED | OUTPATIENT
Start: 2021-11-03 | End: 2021-11-03 | Stop reason: HOSPADM

## 2021-11-03 RX ORDER — ROPIVACAINE HYDROCHLORIDE 5 MG/ML
30 INJECTION, SOLUTION EPIDURAL; INFILTRATION; PERINEURAL
Status: COMPLETED | OUTPATIENT
Start: 2021-11-03 | End: 2021-11-03

## 2021-11-03 RX ORDER — FENTANYL CITRATE 50 UG/ML
100 INJECTION, SOLUTION INTRAMUSCULAR; INTRAVENOUS ONCE
Status: COMPLETED | OUTPATIENT
Start: 2021-11-03 | End: 2021-11-03

## 2021-11-03 RX ORDER — ACETAMINOPHEN 500 MG
500 TABLET ORAL
Status: COMPLETED | OUTPATIENT
Start: 2021-11-03 | End: 2021-11-03

## 2021-11-03 RX ORDER — SODIUM CHLORIDE 0.9 % (FLUSH) 0.9 %
5-40 SYRINGE (ML) INJECTION EVERY 8 HOURS
Status: DISCONTINUED | OUTPATIENT
Start: 2021-11-03 | End: 2021-11-03 | Stop reason: HOSPADM

## 2021-11-03 RX ORDER — SUCCINYLCHOLINE CHLORIDE 20 MG/ML
INJECTION INTRAMUSCULAR; INTRAVENOUS AS NEEDED
Status: DISCONTINUED | OUTPATIENT
Start: 2021-11-03 | End: 2021-11-03 | Stop reason: HOSPADM

## 2021-11-03 RX ORDER — SODIUM CHLORIDE 0.9 % (FLUSH) 0.9 %
5-40 SYRINGE (ML) INJECTION AS NEEDED
Status: DISCONTINUED | OUTPATIENT
Start: 2021-11-03 | End: 2021-11-03 | Stop reason: HOSPADM

## 2021-11-03 RX ORDER — NEOSTIGMINE METHYLSULFATE 1 MG/ML
INJECTION, SOLUTION INTRAVENOUS AS NEEDED
Status: DISCONTINUED | OUTPATIENT
Start: 2021-11-03 | End: 2021-11-03 | Stop reason: HOSPADM

## 2021-11-03 RX ORDER — SODIUM CHLORIDE, SODIUM LACTATE, POTASSIUM CHLORIDE, CALCIUM CHLORIDE 600; 310; 30; 20 MG/100ML; MG/100ML; MG/100ML; MG/100ML
75 INJECTION, SOLUTION INTRAVENOUS CONTINUOUS
Status: DISCONTINUED | OUTPATIENT
Start: 2021-11-03 | End: 2021-11-03 | Stop reason: HOSPADM

## 2021-11-03 RX ORDER — LIDOCAINE HYDROCHLORIDE 10 MG/ML
3 INJECTION INFILTRATION; PERINEURAL ONCE
Status: DISCONTINUED | OUTPATIENT
Start: 2021-11-03 | End: 2021-11-03 | Stop reason: CLARIF

## 2021-11-03 RX ORDER — EPINEPHRINE 1 MG/ML
INJECTION, SOLUTION, CONCENTRATE INTRAVENOUS AS NEEDED
Status: DISCONTINUED | OUTPATIENT
Start: 2021-11-03 | End: 2021-11-03 | Stop reason: HOSPADM

## 2021-11-03 RX ORDER — FENTANYL CITRATE 50 UG/ML
INJECTION, SOLUTION INTRAMUSCULAR; INTRAVENOUS AS NEEDED
Status: DISCONTINUED | OUTPATIENT
Start: 2021-11-03 | End: 2021-11-03 | Stop reason: HOSPADM

## 2021-11-03 RX ORDER — LIDOCAINE HYDROCHLORIDE 20 MG/ML
INJECTION, SOLUTION EPIDURAL; INFILTRATION; INTRACAUDAL; PERINEURAL AS NEEDED
Status: DISCONTINUED | OUTPATIENT
Start: 2021-11-03 | End: 2021-11-03 | Stop reason: HOSPADM

## 2021-11-03 RX ORDER — DEXAMETHASONE SODIUM PHOSPHATE 4 MG/ML
INJECTION, SOLUTION INTRA-ARTICULAR; INTRALESIONAL; INTRAMUSCULAR; INTRAVENOUS; SOFT TISSUE AS NEEDED
Status: DISCONTINUED | OUTPATIENT
Start: 2021-11-03 | End: 2021-11-03 | Stop reason: HOSPADM

## 2021-11-03 RX ORDER — PREGABALIN 75 MG/1
75 CAPSULE ORAL
Status: COMPLETED | OUTPATIENT
Start: 2021-11-03 | End: 2021-11-03

## 2021-11-03 RX ORDER — MIDAZOLAM HYDROCHLORIDE 1 MG/ML
2 INJECTION, SOLUTION INTRAMUSCULAR; INTRAVENOUS ONCE
Status: COMPLETED | OUTPATIENT
Start: 2021-11-03 | End: 2021-11-03

## 2021-11-03 RX ORDER — ONDANSETRON 2 MG/ML
4 INJECTION INTRAMUSCULAR; INTRAVENOUS ONCE
Status: COMPLETED | OUTPATIENT
Start: 2021-11-03 | End: 2021-11-03

## 2021-11-03 RX ORDER — ONDANSETRON 2 MG/ML
INJECTION INTRAMUSCULAR; INTRAVENOUS AS NEEDED
Status: DISCONTINUED | OUTPATIENT
Start: 2021-11-03 | End: 2021-11-03 | Stop reason: HOSPADM

## 2021-11-03 RX ORDER — SODIUM CHLORIDE, SODIUM LACTATE, POTASSIUM CHLORIDE, CALCIUM CHLORIDE 600; 310; 30; 20 MG/100ML; MG/100ML; MG/100ML; MG/100ML
25 INJECTION, SOLUTION INTRAVENOUS CONTINUOUS
Status: DISCONTINUED | OUTPATIENT
Start: 2021-11-03 | End: 2021-11-03 | Stop reason: HOSPADM

## 2021-11-03 RX ORDER — PROPOFOL 10 MG/ML
INJECTION, EMULSION INTRAVENOUS AS NEEDED
Status: DISCONTINUED | OUTPATIENT
Start: 2021-11-03 | End: 2021-11-03 | Stop reason: HOSPADM

## 2021-11-03 RX ADMIN — FENTANYL CITRATE 50 MCG: 50 INJECTION, SOLUTION INTRAMUSCULAR; INTRAVENOUS at 07:50

## 2021-11-03 RX ADMIN — ROPIVACAINE HYDROCHLORIDE 30 ML: 5 INJECTION, SOLUTION EPIDURAL; INFILTRATION; PERINEURAL at 07:16

## 2021-11-03 RX ADMIN — FAMOTIDINE 20 MG: 20 TABLET, FILM COATED ORAL at 06:52

## 2021-11-03 RX ADMIN — PREGABALIN 75 MG: 75 CAPSULE ORAL at 06:52

## 2021-11-03 RX ADMIN — SODIUM CHLORIDE, SODIUM LACTATE, POTASSIUM CHLORIDE, AND CALCIUM CHLORIDE: 600; 310; 30; 20 INJECTION, SOLUTION INTRAVENOUS at 09:23

## 2021-11-03 RX ADMIN — LIDOCAINE HYDROCHLORIDE 50 MG: 20 INJECTION, SOLUTION EPIDURAL; INFILTRATION; INTRACAUDAL; PERINEURAL at 07:50

## 2021-11-03 RX ADMIN — HYDROMORPHONE HYDROCHLORIDE 0.25 MG: 1 INJECTION, SOLUTION INTRAMUSCULAR; INTRAVENOUS; SUBCUTANEOUS at 10:16

## 2021-11-03 RX ADMIN — FENTANYL CITRATE 50 MCG: 50 INJECTION INTRAMUSCULAR; INTRAVENOUS at 07:14

## 2021-11-03 RX ADMIN — ACETAMINOPHEN 500 MG: 500 TABLET ORAL at 06:52

## 2021-11-03 RX ADMIN — CEFAZOLIN SODIUM 2 G: 1 INJECTION, POWDER, FOR SOLUTION INTRAMUSCULAR; INTRAVENOUS at 07:58

## 2021-11-03 RX ADMIN — MIDAZOLAM 1 MG: 1 INJECTION INTRAMUSCULAR; INTRAVENOUS at 07:14

## 2021-11-03 RX ADMIN — ROCURONIUM BROMIDE 30 MG: 50 INJECTION INTRAVENOUS at 07:58

## 2021-11-03 RX ADMIN — SUCCINYLCHOLINE CHLORIDE 100 MG: 20 INJECTION, SOLUTION INTRAMUSCULAR; INTRAVENOUS at 07:51

## 2021-11-03 RX ADMIN — ROPIVACAINE HYDROCHLORIDE 30 ML: 5 INJECTION, SOLUTION EPIDURAL; INFILTRATION; PERINEURAL at 07:20

## 2021-11-03 RX ADMIN — ONDANSETRON 4 MG: 2 INJECTION INTRAMUSCULAR; INTRAVENOUS at 09:21

## 2021-11-03 RX ADMIN — DEXAMETHASONE SODIUM PHOSPHATE 4 MG: 4 INJECTION, SOLUTION INTRAMUSCULAR; INTRAVENOUS at 08:07

## 2021-11-03 RX ADMIN — Medication 3 MG: at 09:21

## 2021-11-03 RX ADMIN — GLYCOPYRROLATE 0.4 MG: 0.2 INJECTION INTRAMUSCULAR; INTRAVENOUS at 09:21

## 2021-11-03 RX ADMIN — CELECOXIB 200 MG: 100 CAPSULE ORAL at 06:52

## 2021-11-03 RX ADMIN — ONDANSETRON 4 MG: 2 INJECTION INTRAMUSCULAR; INTRAVENOUS at 10:16

## 2021-11-03 RX ADMIN — PROPOFOL 150 MG: 10 INJECTION, EMULSION INTRAVENOUS at 07:50

## 2021-11-03 RX ADMIN — SODIUM CHLORIDE, SODIUM LACTATE, POTASSIUM CHLORIDE, AND CALCIUM CHLORIDE 75 ML/HR: 600; 310; 30; 20 INJECTION, SOLUTION INTRAVENOUS at 06:51

## 2021-11-03 NOTE — OP NOTES
Operative Report    Patient: Tiffanie Rivero MRN: [id number]  SSN: [id number]    YOB: 1955  Age: 61 y.o. Sex: female       Date of Surgery: 11/3/2021     Preoperative Diagnosis: M75.121 NONTRAUMATIC COMPLETE TEAR OF RIGHT ROTATOR CUFF     Postoperative Diagnosis: M75.121 NONTRAUMATIC COMPLETE TEAR OF RIGHT ROTATOR CUFF partial tear subscapularis tendon    Surgeon(s) and Role:     Galo Huber MD - Primary    Anesthesia: General     Procedure: Procedure(s):  RIGHT SHOULDER ARTHROSCOPIC ROTATOR CUFF, subscapularis REPAIR, acromioplasty/ARTHREX     Procedure in Detail:      Patient was taken to the operating room to some general trach anesthesia with anesthesia staff. Placed in a standard arthroscopy prado lateral decubitus position right arm prepped with ChloraPrep solution draped free sterile field. A posterior portal was used arthroscopy portal lateral portals were portal portals were made 11 blade followed by blunt trochars. Once the arthroscope was in place the shoulder was inspected biceps tendon was intact labrum was frayed active diffuse degenerative changes noted in the glenohumeral joint. Large rotator cuff tear was present with retraction in the superior third of the subscapularis was torn. Using an anterior inferior cannula the bony bed and the lesser tuberosity was taken to bleeding bone and fiber tape suture was passed through subscapularis tendon was then loaded into swivel lock which was impacted anteriorly effecting very stable construct. Tension was then placed subacromial space where very prominent bursa was debrided using a shaver and a prominent undersurface of acromion was found for which anterior inferior acromioplasty performed lateral tip to the [facility] joint converting type I acromion. The crescent shaped tear 5 cm with retraction to the middle of the glenohumeral joint was well mobilized the bony bed was taken to bleeding bone using a shaver.   Posterior aspect of the tear was stabilized with 2 side-to-side rather 2 interrupted sutures from a corkscrew anchor that was impacted into place. 2 fiber tape anchors at the edge of the articular surface anteriorly posteriorly with fiber tape and mattress sutures passed the rotator cuff tissue with a scorpion. 1 fiber tape along 1 limb of each fiber tape along with a fiber link was loaded into 2 swivel locks were impacted laterally after tensioning. The mattress sutures were tied individually and an extra suture with a fiber link was passed through the rotator cuff tissue was loaded into swivel lock effecting very stable repair. Subcutaneous tissues were closed with 3-0 Vicryl and the skin with 4-0 Monocryl. Sterile dressings were applied patient tolerated procedure well was taken to recovery room without problems    Estimated Blood Loss: Minimal    Tourniquet Time: * No tourniquets in log *      Implants:   Implant Name Type Inv. Item Serial No.  Lot No. LRB No. Used Action   [facility] SUTURE BIOCOMP 4.75X19.1 MM Alicia Dandy [id number]  [facility] SUTURE BIOCOMP 4.75X19.1 MM [employer] [id number] Right 1 Implanted   ANCHOR SUT FT CRKSCR 5.5MM -- W/SUTURETAPE BIOCOMPOSITE - [id number]  ANCHOR SUT FT CRKSCR 5.5MM -- W/SUTURETAPE BIOCOMPOSITE  [employer] [id number] Right 1 Implanted   [employer] Implant System with BioComposite swiveLock    [employer] [id number] Right 1 Implanted   ANCHOR SUTURE BIOCOMP 4.75X19.1 MM Alicia Dandy [id number]  [facility] SUTURE BIOCOMP 4.75X19.1 MM [employer] [id number] Right 1 Implanted               Specimens: * No specimens in log *        Drains: None                Complications: None    Counts: Sponge and needle counts were correct times two.     Signed By:  Farrah Agosto MD     November 3, 2021

## 2021-11-03 NOTE — BRIEF OP NOTE
Brief Postoperative Note    Patient: Ann-Marie Lomax  YOB: 1955  MRN: [id number]    Date of Procedure: 11/3/2021     Pre-Op Diagnosis: M75.121 NONTRAUMATIC COMPLETE TEAR OF RIGHT ROTATOR CUFF    Post-Op Diagnosis: Same as preoperative diagnosis. Subscapularis tear    Procedure(s):  RIGHT SHOULDER ARTHROSCOPIC ROTATOR CUFF, subscapularis REPAIR,acromioplasty/ARTHREX    Surgeon(s):  Phil Stuart MD    Surgical Assistant: Surg Asst-1: Sandra BOUDREAUX    Anesthesia: General     Estimated Blood Loss (mL): Minimal    Complications: None    Specimens: * No specimens in log *     Implants:   Implant Name Type Inv.  Item Serial No.  Lot No. LRB No. Used Action   [facility] SUTURE BIOCOMP 4.75X19.1 MM Elva Lutz [id number]  [facility] SUTURE BIOCOMP 4.75X19.1 MM [employer] [id number] Right 1 Implanted   ANCHOR SUT FT CRKSCR 5.5MM -- W/SUTURETAPE BIOCOMPOSITE - [id number]  ANCHOR SUT FT CRKSCR 5.5MM -- W/SUTURETAPE BIOCOMPOSITE  [id number] Right 1 Implanted   [employer] Implant System with BioComposite swiveLock    [employer] [id number] Right 1 Implanted   [facility] SUTURE BIOCOMP 4.75X19.1 MM Elva Lutz [id number]  [facility] SUTURE BIOCOMP 4.75X19.1 MM [id number] Right 1 Implanted       Drains: * No LDAs found *    Findings: As above  Electronically Signed by Jayden Liriano MD on 11/3/2021 at 9:33 AM

## 2021-11-03 NOTE — DISCHARGE INSTRUCTIONS
Dr. Ai Montoya Shoulder Arthroscopy  Postoperative Information    You will be given a prescription for pain medication. It may be taken every 4-6 hours as needed for the first few days after surgery. You should place an ice bag over your shoulder to help with pain and reduce swelling. A soft bandage was placed on your shoulder. You may take the bandage off two days after surgery and clean the incision sites with peroxide. Band-aids should be placed over each incision site for at least four days. There are 2 or 3 small incisions in your shoulder and they may be sore and develop bruising over the next several days. The bruising should resolve and no special care will be needed. It is safe to take a shower or bathe two days after surgery. You will be given a sling to use. Continue to wear this at all times, unless you are given different instructions. No moving the arm away from your body on your own, reaching or carrying with the operated arm. You will be shown specific exercises when you see your physical therapist tomorrow. Even though your incisions are small, there has been an operation inside and around the shoulder joint. Complete healing may take weeks or several months. To help control your pain after surgery you may have a Pain Care Pump. If so the pump will infuse the shoulder with a local anesthetic for 24-48 hours. Once the pump is empty, the catheter and pump may be removed manually. If you have a high temperature, unexpected pain, redness or swelling in your shoulder please contact my office immediately. Please call to make an appointment to see me in my office in 1 week. Dr. Ai Montoya office number [phone]    Patient Education        Shoulder Arthroscopy: What to Expect at Home  Your Recovery     Arthroscopy is a way to find problems and do surgery inside a joint without making a large cut (incision).  Your doctor put a lighted tube with a tiny camera--called an arthroscope, or scope--and surgical tools through small incisions in your shoulder. Your arm may be in a sling. You will feel tired for several days. Your shoulder will be swollen. And you may notice that your skin is a different color near the cuts the doctor made (incisions). Your hand and arm may also be swollen. This is normal and will go away in a few days. Depending on the medicine you had during the surgery, your entire arm may feel numb or like you can't move it. This goes away in 12 to 24 hours. How soon you can go back to work or your usual routine will depend on your shoulder problem. Most people need 6 weeks or longer to recover. How much time you need depends on the surgery that was done. You may have to limit your activity until your shoulder strength and range of motion are back to normal. You may also be in a rehabilitation program (rehab). If you have a desk job, you may be able to go back to work a few days after the surgery. If you lift things at work, it may take months before you can go back. This care sheet gives you a general idea about how long it will take for you to recover. But each person recovers at a different pace. Follow the steps below to get better as quickly as possible. How can you care for yourself at home? Activity    · Rest when you feel tired. Getting enough sleep will help you recover. You may be more comfortable if you sleep in a reclining chair. To make your arm and shoulder feel better, keep a thin pillow under the back of your arm while you are lying down.     · Try to walk each day. Start by walking a little more than you did the day before. Bit by bit, increase the amount you walk. Walking boosts blood flow and helps prevent pneumonia and constipation.     · For 2 to 3 weeks, avoid lifting anything heavier than a plate or a glass. You need to give your shoulder time to heal.     · Your arm may be in a sling.  You may need to use the sling for a few days to a few weeks. Your doctor will advise you on how long to wear the sling.     · You may take the sling off when you dress or wash.     · Do not use your arm for repeated movements. These include painting, vacuuming, or using a computer. Diet    · You can eat your normal diet. If your stomach is upset, try bland, low-fat foods like plain rice, broiled chicken, toast, and yogurt.     · Drink plenty of fluids, unless your doctor tells you not to.     · You may notice that your bowel movements are not regular right after your surgery. This is common. Try to avoid constipation and straining with bowel movements. You may want to take a fiber supplement every day. If you have not had a bowel movement after a couple of days, ask your doctor about taking a mild laxative. Medicines    · Your doctor will tell you if and when you can restart your medicines. He or she will also give you instructions about taking any new medicines.     · If you take aspirin or some other blood thinner, ask your doctor if and when to start taking it again. Make sure that you understand exactly what your doctor wants you to do.     · Take pain medicines exactly as directed. ? If the doctor gave you a prescription medicine for pain, take it as prescribed. ? If you are not taking a prescription pain medicine, ask your doctor if you can take an over-the-counter medicine.     · If you think your pain medicine is making you sick to your stomach:  ? Take your medicine after meals (unless your doctor has told you not to). ? Ask your doctor for a different pain medicine.     · If your doctor prescribed antibiotics, take them as directed. Do not stop taking them just because you feel better. You need to take the full course of antibiotics. Incision care    · If you have a dressing over your incision, keep it clean and dry.  You may remove it 2 to 3 days after the surgery.     · If your incision is open to the air, keep the area clean and dry.     · If you have strips of tape on the incision, leave the tape on for a week or until it falls off. Exercise    · You may need rehabilitation. This is a series of exercises you do after your surgery. Rehab helps you get back your shoulder's range of motion and strength. You will work with your doctor and physical therapist to plan this exercise program. To get the best results, you need to do the exercises correctly and as often and as long as your doctor tells you. Ice    · To reduce swelling and pain, put ice or a cold pack on your shoulder for 10 to 20 minutes at a time. Do this every 1 to 2 hours. Put a thin cloth between the ice and your skin. Follow-up care is a key part of your treatment and safety. Be sure to make and go to all appointments, and call your doctor if you are having problems. It's also a good idea to know your test results and keep a list of the medicines you take. When should you call for help? Call 911 anytime you think you may need emergency care. For example, call if:    · You passed out (lost consciousness).     · You have severe trouble breathing.     · You have sudden chest pain and shortness of breath, or you cough up blood. Call your doctor now or seek immediate medical care if:    · Your hand is cool, pale, or numb, or it changes color.     · You are unable to move your fingers, wrist, or elbow.     · You are sick to your stomach or cannot keep fluids down.     · You have pain that does not get better after you take pain medicine.     · You have signs of infection, such as:  ? Increased pain, swelling, warmth, or redness. ? Red streaks leading from the incision. ? Pus draining from the incision. ? A fever.     · You have loose stitches, or your incision comes open.     · Your incision bleeds through your first dressing or is still bleeding 3 days after your surgery.    Watch closely for changes in your health, and be sure to contact your doctor if:    · Your sling feels too tight, and you cannot loosen it.     · You have new or increased swelling in your arm.     · You have new pain that develops in another area of the affected limb. For example, you have pain in your hand or elbow.     · You do not have a bowel movement after taking a laxative.     · You do not get better as expected. DISCHARGE SUMMARY from Nurse    PATIENT INSTRUCTIONS:    After general anesthesia or intravenous sedation, for 24 hours or while taking prescription Narcotics:  · Limit your activities  · Do not drive and operate hazardous machinery  · Do not make important personal or business decisions  · Do  not drink alcoholic beverages  · If you have not urinated within 8 hours after discharge, please contact your surgeon on call. Report the following to your surgeon:  · Excessive pain, swelling, redness or odor of or around the surgical area  · Temperature over 100.5  · Nausea and vomiting lasting longer than 4 hours or if unable to take medications  · Any signs of decreased circulation or nerve impairment to extremity: change in color, persistent  numbness, tingling, coldness or increase pain  · Any questions    What to do at Home:    *  Please give a list of your current medications to your Primary Care Provider. *  Please update this list whenever your medications are discontinued, doses are      changed, or new medications (including over-the-counter products) are added. *  Please carry medication information at all times in case of emergency situations. These are general instructions for a healthy lifestyle:    No smoking/ No tobacco products/ Avoid exposure to second hand smoke  Surgeon General's Warning:  Quitting smoking now greatly reduces serious risk to your health.     Obesity, smoking, and sedentary lifestyle greatly increases your risk for illness    A healthy diet, regular physical exercise & weight monitoring are important for maintaining a healthy lifestyle    You may be retaining fluid if you have a history of heart failure or if you experience any of the following symptoms:  Weight gain of 3 pounds or more overnight or 5 pounds in a week, increased swelling in our hands or feet or shortness of breath while lying flat in bed. Please call your doctor as soon as you notice any of these symptoms; do not wait until your next office visit. The discharge information has been reviewed with the patient. The patient verbalized understanding. Discharge medications reviewed with the patient and appropriate educational materials and side effects teaching were provided.   ___________________________________________________________________________________________________________________________________

## 2021-11-03 NOTE — ANESTHESIA POSTPROCEDURE EVALUATION
Procedure(s):  RIGHT SHOULDER ARTHROSCOPIC ROTATOR CUFF REPAIR/ARTHREX.    general, regional    Anesthesia Post Evaluation      Multimodal analgesia: multimodal analgesia used between 6 hours prior to anesthesia start to PACU discharge  Patient location during evaluation: PACU  Patient participation: complete - patient participated  Level of consciousness: awake and alert  Pain management: adequate  Airway patency: patent  Anesthetic complications: no  Cardiovascular status: acceptable and hemodynamically stable  Respiratory status: acceptable  Hydration status: acceptable  Post anesthesia nausea and vomiting:  controlled      INITIAL Post-op Vital signs:   Vitals Value Taken Time   /71 11/03/21 1027   Temp 36.2 °C (97.2 °F) 11/03/21 0946   Pulse 79 11/03/21 1035   Resp 14 11/03/21 1035   SpO2 94 % 11/03/21 1035   Vitals shown include unvalidated device data.

## 2021-11-03 NOTE — ANESTHESIA PROCEDURE NOTES
Peripheral Block    Start time: 11/3/2021 7:13 AM  End time: 11/3/2021 7:20 AM  Performed by: Lawanda Rushing MD  Authorized by: Lawanda Rushing MD       Pre-procedure: Indications: at surgeon's request and post-op pain management    Preanesthetic Checklist: patient identified, risks and benefits discussed, site marked, timeout performed, anesthesia consent given and patient being monitored    Timeout Time: 07:13 EDT          Block Type:   Block Type:   Interscalene  Laterality:  Right  Monitoring:  Standard ASA monitoring, continuous pulse ox, frequent vital sign checks, heart rate, oxygen and responsive to questions  Injection Technique:  Single shot  Procedures: ultrasound guided and nerve stimulator    Patient Position: supine  Prep: chlorhexidine    Location:  Interscalene  Needle Type:  Stimuplex  Needle Gauge:  22 G  Needle Localization:  Nerve stimulator and ultrasound guidance  Motor Response comment:   Motor Response: minimal motor response >0.4 mA   Medication Injected:  Ropivacaine (PF) (NAROPIN)(0.5%) 5 mg/mL injection, 30 mL  Med Admin Time: 11/3/2021 7:20 AM    Assessment:  Number of attempts:  1  Injection Assessment:  Incremental injection every 5 mL, local visualized surrounding nerve on ultrasound, negative aspiration for blood, negative aspiration for CSF, no paresthesia, no intravascular symptoms and ultrasound image on chart  Patient tolerance:  Patient tolerated the procedure well with no immediate complications

## 2021-11-03 NOTE — ANESTHESIA PREPROCEDURE EVALUATION
Anesthetic History   No history of anesthetic complications            Review of Systems / Medical History  Patient summary reviewed, nursing notes reviewed and pertinent labs reviewed    Pulmonary          Smoker         Neuro/Psych   Within defined limits           Cardiovascular  Within defined limits                     GI/Hepatic/Renal  Within defined limits              Endo/Other        Obesity     Other Findings            Physical Exam    Airway  Mallampati: II  TM Distance: 4 - 6 cm  Neck ROM: normal range of motion   Mouth opening: Normal     Cardiovascular    Rhythm: regular           Dental    Dentition: Upper dentition intact and Lower dentition intact     Pulmonary  Breath sounds clear to auscultation               Abdominal  GI exam deferred       Other Findings            Anesthetic Plan    ASA: 2  Anesthesia type: general and regional - interscalene block            Anesthetic plan and risks discussed with: Patient

## 2021-11-04 ENCOUNTER — HOSPITAL ENCOUNTER (OUTPATIENT)
Dept: PHYSICAL THERAPY | Age: 66
Discharge: HOME | End: 2021-11-04
Attending: ORTHOPAEDIC SURGERY
Payer: COMMERCIAL

## 2021-11-04 PROCEDURE — 97110 THERAPEUTIC EXERCISES: CPT

## 2021-11-04 PROCEDURE — 97535 SELF CARE MNGMENT TRAINING: CPT

## 2021-11-04 PROCEDURE — 97161 PT EVAL LOW COMPLEX 20 MIN: CPT

## 2021-11-04 NOTE — PROGRESS NOTES
[facility]  [address]  [place], [address]  [phone] fax    Plan of Care/ Statement of Necessity for Physical Therapy Services    Patient name: Jaun Severe Start of Care: 2021   Referral source: Jose El : 1955    Medical Diagnosis: Right rotator cuff tear [M75.101]  Status post rotator cuff surgery [Z98.890]  Payor: BLUE CROSS / Plan: Peggy Dunaway / Product Type: HMO /  Onset Date:11/3/2021 DOS    Treatment Diagnosis: right RTC repair   Prior Hospitalization: see medical history Provider#: [id number]   Medications: Verified on Patient summary List    Comorbidities: tobacco use, BMI > 30    Prior Level of Function: some diffuse shoulder pain prior to surgery but independence ADLs and self care. The Plan of Care and following information is based on the information from the initial evaluation. Assessment/ key information: pt is a 62-year-old woman presenting to the clinic s/p a right RTC repair performed on 11/3/2021. Pt reports massive non-traumatic tear, with MRI specifying subscapularis, supraspinatus, and infraspinatus. Pt instructed today in donning of undergarment, shirt, and sling without use of the right UE for assist. Pt demonstrates good tolerance to PROM today, able to achieve 145 deg flexion, 140 deg scaption, and 70 deg ER before initiation of pain. Pt educated to always inform the therapist if pain begins to onset so that we may assist with protecting integrity of the repair. Pt confirms she is icing at home and taking pain meds before therapy. Signs, symptoms, and impairments are consistent with post-operative status.     Patient will benefit from skilled PT services to modify and progress therapeutic interventions, address functional mobility deficits, address ROM deficits, address strength deficits, analyze and address soft tissue restrictions, analyze and cue movement patterns, analyze and modify body mechanics/ergonomics and assess and modify postural abnormalities to attain remaining goals. Evaluation Complexity History MEDIUM  Complexity : 1-2 comorbidities / personal factors will impact the outcome/ POC ; Examination LOW Complexity : 1-2 Standardized tests and measures addressing body structure, function, activity limitation and / or participation in recreation  ;Presentation LOW Complexity : Stable, uncomplicated  ;Clinical Decision Making HIGH Complexity : FOTO score of 1- 25   Overall Complexity Rating: LOW   Problem List: pain affecting function, decrease ROM, decrease strength, edema affecting function, decrease ADL/ functional abilitiies, decrease activity tolerance and decrease flexibility/ joint mobility   Treatment Plan may include any combination of the following: Therapeutic exercise, Therapeutic activities, Neuromuscular re-education, Physical agent/modality, Manual therapy, Patient education, Self Care training, Functional mobility training and Home safety training  Patient / Family readiness to learn indicated by: asking questions, trying to perform skills and interest  Persons(s) to be included in education: patient (P)  Barriers to Learning/Limitations: None  Patient Goal (s): full use  Patient Self Reported Health Status: excellent  Rehabilitation Potential: good    Short Term Goals: To be accomplished in 2 weeks:  1. Pt will report compliance with HEP to maximize therapeutic success. 2. Pt will attain > 150 deg flexion PROM to progress towards improved capacity of ADLs as protocol progresses. Long Term Goals: To be accomplished in 4 weeks:  1. Pt will attain > 165 deg flexion PROM to progress towards improved capacity of ADLs as protocol progresses. 2. Pt will attain or retain > 140 deg scaption PROM to progress towards improved capacity of ADLs as protocol progresses.   3. Pt will attain or retain 70 deg ER PROM to progress towards improved capacity of ADLs as protocol progresses. 4. Pt will report ability to don and doff clothing and sling with independence to improve ease of self care. Frequency / Duration: Patient to be seen 3 times per week for 4 weeks. Patient/ Caregiver education and instruction: Diagnosis, prognosis, self care, activity modification, brace/ splint application and exercises   [x]  Plan of care has been reviewed with BOONE Gabriel, PT 11/4/2021 11:31 AM    ________________________________________________________________________    I certify that the above Therapy Services are being furnished while the patient is under my care. I agree with the treatment plan and certify that this therapy is necessary.     [de-identified] Signature:____________Date:_________TIME:________     Jasmine Reno,*  ** Signature, Date and Time must be completed for valid certification **    Please sign and return to [facility] [address]  [address]  [place], [address]  [phone] fax

## 2021-11-04 NOTE — PROGRESS NOTES
PT DAILY TREATMENT NOTE     Patient Name: Jefferson Smith  Date:2021  : 1955  [x]  Patient  Verified  Payor: Fayetta Hamman / Plan: Pearl Nevarez / Product Type: HMO /    In time:1018AM  Out time:1110AM  Total Treatment Time (min): 52  Visit #: 1 of 12    Medicare/BCBS Only   Total Timed Codes (min):  44 1:1 Treatment Time:  52       Treatment Area: Right rotator cuff tear [M75.101]  Status post rotator cuff surgery [Z98.890]    SUBJECTIVE  Pain Level (0-10 scale): 8  Any medication changes, allergies to medications, adverse drug reactions, diagnosis change, or new procedure performed?: [x] No    [] Yes (see summary sheet for update)  Subjective functional status/changes:   [] No changes reported  Pt asks for assist with donning a bra, shirt, and education on how to don sling. OBJECTIVE    Modality rationale: decrease edema, decrease inflammation and decrease pain to improve the patients ability to manage self care post-therapy. Min Type Additional Details   10 [x]  Vasopneumatic Device    [x]  Right     []  Left  Pre-treatment girth:  Post-treatment girth:  Measured at (location):  Pressure:       [x] lo [] med [] hi   Temperature: [x] lo [] med [] hi   [] Skin assessment post-treatment:  []intact []redness- no adverse reaction    []redness  adverse reaction:      8 min [x]Eval                  []Re-Eval       28 min Therapeutic Exercise:  [] See flow sheet : HEP, PROM   Rationale: increase ROM to improve the patients ability to manage self care and progress per protocol. 10 min Self Care/Home Management:  []  See flow sheet :Pt education and assist with donning bra, shirt, sling and education for how to do at home. Ice throughout day. Partial education during game ready administration. Rationale: improve understanding  to improve the patients ability to dress and manage self care with increased independence.              With   [] TE   [] TA   [] neuro   [] other: Patient Education: [x] Review HEP    [] Progressed/Changed HEP based on:   [] positioning   [] body mechanics   [] transfers   [] heat/ice application    [] other:    Other Objective/Functional Measures: see POC and eval     Pain Level (0-10 scale) post treatment: 0    ASSESSMENT/Changes in Function: pt is a 62-year-old woman presenting to the clinic s/p a right RTC repair performed on 11/3/2021. Pt reports massive non-traumatic tear, with MRI specifying subscapularis, supraspinatus, and infraspinatus. Pt instructed today in donning of undergarment, shirt, and sling without use of the right UE for assist. Pt demonstrates good tolerance to PROM today, able to achieve 145 deg flexion, 140 deg scaption, and 70 deg ER before initiation of pain. Pt educated to always inform the therapist if pain begins to onset so that we may assist with protecting integrity of the repair. Pt confirms she is icing at home and taking pain meds before therapy. Signs, symptoms, and impairments are consistent with post-operative status. Patient will benefit from skilled PT services to modify and progress therapeutic interventions, address functional mobility deficits, address ROM deficits, address strength deficits, analyze and address soft tissue restrictions, analyze and cue movement patterns, analyze and modify body mechanics/ergonomics and assess and modify postural abnormalities to attain remaining goals. [x]  See Plan of Care  []  See progress note/recertification  []  See Discharge Summary         Progress towards goals / Updated goals:  Short Term Goals: To be accomplished in 2 weeks:  1. Pt will report compliance with HEP to maximize therapeutic success. Eval: HEP assigned  2. Pt will attain > 150 deg flexion PROM to progress towards improved capacity of ADLs as protocol progresses. Eval: 145 deg post op day 2  Long Term Goals: To be accomplished in 4 weeks:  1.  Pt will attain > 165 deg flexion PROM to progress towards improved capacity of ADLs as protocol progresses. Eval: 145 deg post op day 2  2. Pt will attain or retain > 140 deg scaption PROM to progress towards improved capacity of ADLs as protocol progresses. Eval: 140 deg on post-op day 2 with pain meds  3. Pt will attain or retain 70 deg ER PROM to progress towards improved capacity of ADLs as protocol progresses. Eval: 70 deg on post-op day 2 with pain meds  4. Pt will report ability to don and doff clothing and sling with independence to improve ease of self care.    Eval: modA      PLAN  []  Upgrade activities as tolerated     [x]  Continue plan of care  []  Update interventions per flow sheet       []  Discharge due to:_  []  Other:_      Dmitri Chin, PT 11/4/2021  11:21 AM    Future Appointments   Date Time Provider Josiah Hernandez   11/8/2021  1:45 PM Geneva Kicks, PT [facility]   11/10/2021  2:40 PM Anna Pierce MD [facility]   11/10/2021  5:15 PM Marychuy Sarkara [facility]   11/12/2021  3:00 PM David Lorenzana, PTA [facility]   11/15/2021  1:45 PM Geneva Kicks, PT [facility]   11/17/2021  3:45 PM David Lorenzana, PTA [facility]   11/19/2021  1:00 PM Danni Chang, PTA [facility]   11/22/2021  2:30 PM WinterDanni, PTA [facility]   11/23/2021 12:45 PM David Lorenzana, PTA [facility]   11/29/2021  2:30 PM Geneva Kicks, PT [facility]   12/1/2021  1:30 PM Geneva Kicks, PT [facility]   12/3/2021  2:15 PM Geneva Kicks, PT [facility]   12/6/2021  2:30 PM Geneva Kicks, PT [facility]   12/8/2021  4:30 PM Geneva Kicks, PT [facility]   12/10/2021 12:00 PM Geneva Kicks, PT [facility]

## 2021-11-08 ENCOUNTER — HOSPITAL ENCOUNTER (OUTPATIENT)
Dept: PHYSICAL THERAPY | Age: 66
Discharge: HOME | End: 2021-11-08
Attending: ORTHOPAEDIC SURGERY
Payer: COMMERCIAL

## 2021-11-08 PROCEDURE — 97140 MANUAL THERAPY 1/> REGIONS: CPT

## 2021-11-08 PROCEDURE — 97110 THERAPEUTIC EXERCISES: CPT

## 2021-11-08 NOTE — PROGRESS NOTES
PT DAILY TREATMENT NOTE     Patient Name: Mo Campbell  Date:2021  : 1955  [x]  Patient  Verified  Payor: Jackelyn Alvarez / Plan: Silver Whiting / Product Type: HMO /    In time:147pm  Out time:225pm  Total Treatment Time (min): 38  Visit #: 2 of 12    Medicare/BCBS Only   Total Timed Codes (min):  38 1:1 Treatment Time:  28       Treatment Area: Right rotator cuff tear [M75.101]  Status post rotator cuff surgery [Z98.890]    SUBJECTIVE  Pain Level (0-10 scale): 0  Any medication changes, allergies to medications, adverse drug reactions, diagnosis change, or new procedure performed?: [x] No    [] Yes (see summary sheet for update)  Subjective functional status/changes:   [] No changes reported  Pt has good pain control today taking Advil. Pt reports she had been taking her prescribed pain medication but hadn't had a bowel movement in 5 days so she discontinued it and used a laxative yesterday. OBJECTIVE    Modality rationale: decrease inflammation and decrease pain to improve the patients ability to manage self care. Min Type Additional Details   10 [x]  Vasopneumatic Device     [x]  Right     []  Left  Pre-treatment girth:  Post-treatment girth:  Measured at (location):  Pressure:       [x] lo [] med [] hi   Temperature: [x] lo [] med [] hi   [] Skin assessment post-treatment:  []intact []redness- no adverse reaction    []redness  adverse reaction:     13 min Therapeutic Exercise:  [x] See flow sheet :   Rationale: increase ROM and improve coordination to improve the patients ability to perform ADLs as protocol progresses. 15 min Manual Therapy: In supine, performed GR1 long axis distraction oscillations for pain control while performing ROM into flexion, scaption, abduction, ER, and IR. In left S/L with towel roll in axilla, performed STM to right UT and LS followed by scapular clocks.     The manual therapy interventions were performed at a separate and distinct time from the therapeutic activities interventions. Rationale: decrease pain, increase ROM and increase tissue extensibility to improve comfort with self care and ADLs. With   [] TE   [] TA   [] neuro   [] other: Patient Education: [x] Review HEP    [] Progressed/Changed HEP based on:   [] positioning   [] body mechanics   [] transfers   [] heat/ice application    [] other:      Other Objective/Functional Measures: exercises initiated for first f/u per flowsheet     Pain Level (0-10 scale) post treatment: 0    ASSESSMENT/Changes in Function: Pt demonstrates excellent soft tissue mobility and ROM today, able to achieve full IR/ER and achieving ~ 150 deg flexion. No pain is reported with repeated check-in. Attempted to adjust brace as it is compressing her neck but it seems to be scewing upwards no matter how adjusted due to chest width. Patient will continue to benefit from skilled PT services to modify and progress therapeutic interventions, address functional mobility deficits, address ROM deficits, address strength deficits, analyze and address soft tissue restrictions, analyze and cue movement patterns, analyze and modify body mechanics/ergonomics and assess and modify postural abnormalities to attain remaining goals. []  See Plan of Care  []  See progress note/recertification  []  See Discharge Summary         Progress towards goals / Updated goals:  Short Term Goals: To be accomplished in 2 weeks:  1. Pt will report compliance with HEP to maximize therapeutic success. Eval: HEP assigned   Current: met, reports compliance. (11/8/2021)  2. Pt will attain > 150 deg flexion PROM to progress towards improved capacity of ADLs as protocol progresses. Eval: 145 deg post op day 2  Long Term Goals: To be accomplished in 4 weeks:  1. Pt will attain > 165 deg flexion PROM to progress towards improved capacity of ADLs as protocol progresses. Eval: 145 deg post op day 2  2.  Pt will attain or retain > 140 deg scaption PROM to progress towards improved capacity of ADLs as protocol progresses. Eval: 140 deg on post-op day 2 with pain meds  3. Pt will attain or retain 70 deg ER PROM to progress towards improved capacity of ADLs as protocol progresses. Eval: 70 deg on post-op day 2 with pain meds  4. Pt will report ability to don and doff clothing and sling with independence to improve ease of self care.               Eval: modA    PLAN  []  Upgrade activities as tolerated     [x]  Continue plan of care  []  Update interventions per flow sheet       []  Discharge due to:_  []  Other:_      Yudelka Elam, PT 11/8/2021  1:52 PM    Future Appointments   Date Time Provider Josiah Hernandez   11/10/2021  2:40 PM Melina Monday, MD [facility]   11/10/2021  5:15 PM Alvin Blanco [facility]   11/12/2021  3:00 PM [facility]   11/15/2021  1:45 PM Javier Found, PT [facility]   11/17/2021  3:45 PM [facility]   11/19/2021  1:00 PM Danni Chang, PTA [facility]   11/22/2021  2:30 PM Danni Chang, PTA [facility]   11/23/2021 12:45 PM Gunjan Jojo, PTA [facility]   11/29/2021  2:30 PM Javier Found, PT [facility]   12/1/2021  1:30 PM Javier Found, PT [facility]   12/3/2021  2:15 PM Javier Found, PT [facility]   12/6/2021  2:30 PM Javier Found, PT [facility]   12/8/2021  4:30 PM Javier Found, PT [facility]   12/10/2021 12:00 PM Javier Found, PT [facility]

## 2021-11-10 ENCOUNTER — HOSPITAL ENCOUNTER (OUTPATIENT)
Dept: PHYSICAL THERAPY | Age: 66
Discharge: HOME | End: 2021-11-10
Attending: ORTHOPAEDIC SURGERY
Payer: COMMERCIAL

## 2021-11-10 ENCOUNTER — OFFICE VISIT (OUTPATIENT)
Dept: ORTHOPEDIC SURGERY | Age: 66
End: 2021-11-10
Payer: COMMERCIAL

## 2021-11-10 VITALS — HEART RATE: 92 BPM | TEMPERATURE: 97.4 F | OXYGEN SATURATION: 97 %

## 2021-11-10 DIAGNOSIS — M75.121 NONTRAUMATIC COMPLETE TEAR OF RIGHT ROTATOR CUFF: Primary | ICD-10-CM

## 2021-11-10 DIAGNOSIS — G89.18 ACUTE POSTOPERATIVE PAIN: ICD-10-CM

## 2021-11-10 PROCEDURE — 97140 MANUAL THERAPY 1/> REGIONS: CPT

## 2021-11-10 PROCEDURE — 99024 POSTOP FOLLOW-UP VISIT: CPT | Performed by: ORTHOPAEDIC SURGERY

## 2021-11-10 PROCEDURE — 97110 THERAPEUTIC EXERCISES: CPT

## 2021-11-10 NOTE — PROGRESS NOTES
PT DAILY TREATMENT NOTE     Patient Name: Kobe Diggs  Date:11/10/2021  : 1955  [x]  Patient  Verified  Payor: Romy Sessions / Plan: Drew Hansen / Product Type: HMO /    In time:515pm  Out time:6pm  Total Treatment Time (min): 45  Visit #: 3 of 12    Medicare/BCBS Only   Total Timed Codes (min):  45 1:1 Treatment Time:  24       Treatment Area: Right rotator cuff tear [M75.101]  Status post rotator cuff surgery [Z98.890]    SUBJECTIVE  Pain Level (0-10 scale): 0  Any medication changes, allergies to medications, adverse drug reactions, diagnosis change, or new procedure performed?: [x] No    [] Yes (see summary sheet for update)  Subjective functional status/changes:   [] No changes reported  Reports that she has been undoing the around her neck strap at night due to discomfort at the neck but feels confident that her arm will not move with sleep in her recliner. OBJECTIVE    Modality rationale: decrease inflammation and decrease pain to improve the patients ability to tolerate self care. Min Type Additional Details   10 [x]  Vasopneumatic Device    [x]  Right     []  Left  Pre-treatment girth:  Post-treatment girth:  Measured at (location):  Pressure:       [x] lo [] med [] hi   Temperature: [x] lo [] med [] hi   [] Skin assessment post-treatment:  []intact []redness- no adverse reaction    []redness  adverse reaction:     9 min Therapeutic Exercise:  [x] See flow sheet :   Rationale: increase ROM to improve the patients ability to manage self care. 15 min Manual Therapy: In supine, performed GR1 long axis distraction oscillations for pain control while performing ROM into flexion, scaption, abduction, ER, and IR. In left S/L with towel roll in axilla, performed STM to right UT, lats, and LS followed by scapular clocks. The manual therapy interventions were performed at a separate and distinct time from the therapeutic activities interventions.   Rationale: decrease pain, increase ROM and increase tissue extensibility to improve capacity for overhead reaching when protocol/healing allows. With   [] TE   [] TA   [] neuro   [] other: Patient Education: [x] Review HEP    [] Progressed/Changed HEP based on:   [] positioning   [] body mechanics   [] transfers   [] heat/ice application    [] other:      Other Objective/Functional Measures: 160 deg flexion with \"pulling\" at surgical site, 140 deg scaption     Pain Level (0-10 scale) post treatment: 0    ASSESSMENT/Changes in Function: Pt continues to maintain very good ROM for 1 week post-op status. She requires cues today to keep motion passive as she has some activation with PROM today that causes discomfort (eccentric lowering). STG 2 met today. Advised pt that undoing that strap is ok with sleep as long as the arm is supported and she is completely confident that the arm will not move. Patient will continue to benefit from skilled PT services to modify and progress therapeutic interventions, address functional mobility deficits, address ROM deficits, address strength deficits, analyze and address soft tissue restrictions, analyze and cue movement patterns, analyze and modify body mechanics/ergonomics and assess and modify postural abnormalities to attain remaining goals. []  See Plan of Care  []  See progress note/recertification  []  See Discharge Summary         Progress towards goals / Updated goals:  Short Term Goals: To be accomplished in 2 weeks:  1. Pt will report compliance with HEP to maximize therapeutic success.              Eval: HEP assigned              Current: met, reports compliance. (11/8/2021)  2. Pt will attain > 150 deg flexion PROM to progress towards improved capacity of ADLs as protocol progresses.             Eval: 145 deg post op day 2   Current: met, 160 deg (11/10/2021)  Long Term Goals: To be accomplished in 4 weeks:  1.  Pt will attain > 165 deg flexion PROM to progress towards improved capacity of ADLs as protocol progresses.             Eval: 145 deg post op day 2   Current: progressing to 160 deg (11/10/2021)  2. Pt will attain or retain > 140 deg scaption PROM to progress towards improved capacity of ADLs as protocol progresses.             Eval: 140 deg on post-op day 2 with pain meds   Current: 140 deg PROM (11/10/2021)  3. Pt will attain or retain 70 deg ER PROM to progress towards improved capacity of ADLs as protocol progresses.             Eval: 70 deg on post-op day 2 with pain meds  4.  Pt will report ability to don and doff clothing and sling with independence to improve ease of self care.              Eval: modA    PLAN  []  Upgrade activities as tolerated     [x]  Continue plan of care  []  Update interventions per flow sheet       []  Discharge due to:_  []  Other:_      Nicole Dawn, PT 11/10/2021  5:27 PM    Future Appointments   Date Time Provider Josiah Hernandez   11/12/2021  3:00 PM Steffany Nails, PTA [facility]   11/15/2021  1:45 PM Emilee Salas, PT [facility]   11/17/2021  3:45 PM Steffany Nails, PTA [facility]   11/19/2021  1:00 PM Danni Chang, PTA [facility]   11/22/2021  2:30 PM Danni Chang, PTA [facility]   11/23/2021 12:45 PM Steffany Nails, PTA [facility]   11/29/2021  2:30 PM Emilee Salas, PT [facility]   12/1/2021  1:30 PM Emilee Salas, PT [facility]   12/1/2021  2:50 PM Lupe Hare MD [facility]   12/3/2021  2:15 PM Emilee Salas, PT [facility]   12/6/2021  2:30 PM Emilee Salas, PT [facility]   12/8/2021  4:30 PM Emilee Salas, PT [facility]   12/10/2021 12:00 PM Emilee Salas, PT [facility]

## 2021-11-10 NOTE — PROGRESS NOTES
Mayi Enciso  1955   Chief Complaint   Patient presents with    Shoulder Injury     right        HISTORY OF PRESENT ILLNESS  Mayi Enciso is a 61 y.o. female who presents today for reevaluation of right shoulder arthroscopic rotator cuff repair performed on 11/3/2021. Patient rates pain as 0/10 today. Pt states her pain is well-controlled and she has been doing PT and HEP without any issues. 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 Patient denies any fever, chills, chest pain, shortness of breath or calf pain. The remainder of the review of systems is negative. There are no new illness or injuries to report since last seen in the office. There are no changes to medications, allergies, family or social history. PHYSICAL EXAM:   Visit Vitals  Pulse 92   Temp 97.4 °F (36.3 °C) (Temporal)   SpO2 97%     The patient is a well-developed, well-nourished female   in no acute distress. The patient is alert and oriented times three. The patient is alert and oriented times three. Mood and affect are normal.  LYMPHATIC: lymph nodes are not enlarged and are within normal limits  SKIN: normal in color and non tender to palpation. There are no bruises or abrasions noted. NEUROLOGICAL: Motor sensory exam is within normal limits. Reflexes are equal bilaterally. There is normal sensation to pinprick and light touch  MUSCULOSKELETAL:  Wounds are clean and dry glenohumeral abduction is 90 degrees with comfort        PROCEDURE: none    IMAGING: none    IMPRESSION:      ICD-10-CM ICD-9-CM    1. Nontraumatic complete tear of right rotator cuff  M75.121 727.61    2. Acute postoperative pain  G89.18 338.18         PLAN:   1. PT presents today s/p right rotator cuff repair surgery. I will advise her to continue PT and activities as tolerated. Risk factors include: none  2. No ultrasound exam indicated today  3. No cortisone injection indicated today   4.  No Physical/Occupational Therapy indicated today  5. No diagnostic test indicated today:   6. No durable medical equipment indicated today  7. No referral indicated today   8. No medications indicated today:   9. No Narcotic indicated today for short term acute pain. RTC 2 weeks      Scribed by Geri Romberg Moshe Chock) as dictated by Ila Haines MD    I, Dr. Ila Haines, confirm that all documentation is accurate.     Ila Haines M.D.   [facility]

## 2021-11-12 ENCOUNTER — HOSPITAL ENCOUNTER (OUTPATIENT)
Dept: PHYSICAL THERAPY | Age: 66
Discharge: HOME | End: 2021-11-12
Attending: ORTHOPAEDIC SURGERY
Payer: COMMERCIAL

## 2021-11-12 PROCEDURE — 97140 MANUAL THERAPY 1/> REGIONS: CPT

## 2021-11-12 PROCEDURE — 97110 THERAPEUTIC EXERCISES: CPT

## 2021-11-12 NOTE — PROGRESS NOTES
PT DAILY TREATMENT NOTE     Patient Name: Elvira Hernandez  Date:2021  : 1955  [x]  Patient  Verified  Payor: Shanae Caldera / Plan: Algis Bamberger / Product Type: HMO /    In time:3:06  Out time:3:52  Total Treatment Time (min): 55  Visit #: 4 of 12    Medicare/BCBS Only   Total Timed Codes (min):  36 1:1 Treatment Time: 33       Treatment Area: Right rotator cuff tear [M75.101]  Status post rotator cuff surgery [Z98.890]    SUBJECTIVE  Pain Level (0-10 scale): 0  Any medication changes, allergies to medications, adverse drug reactions, diagnosis change, or new procedure performed?: [x] No    [] Yes (see summary sheet for update)  Subjective functional status/changes:   [] No changes reported  Pt reports no changes since last visit. OBJECTIVE    Modality rationale: decrease inflammation and decrease pain to improve the patients ability to tolerate post workout soreness.    Min Type Additional Details    [] Estim:  []Unatt       []IFC  []Premod                        []Other:  []w/ice   []w/heat  Position:  Location:    [] Estim: []Att    []TENS instruct  []NMES                    []Other:  []w/US   []w/ice   []w/heat  Position:  Location:    []  Traction: [] Cervical       []Lumbar                       [] Prone          []Supine                       []Intermittent   []Continuous Lbs:  [] before manual  [] after manual    []  Ultrasound: []Continuous   [] Pulsed                           []1MHz   []3MHz W/cm2:  Location:    []  Iontophoresis with dexamethasone         Location: [] Take home patch   [] In clinic    []  Ice     []  heat  []  Ice massage  []  Laser   []  Anodyne Position:  Location:    []  Laser with stim  []  Other:  Position:  Location:   10 [x]  Vasopneumatic Device    [x]  Right     []  Left  Pre-treatment girth:  Post-treatment girth:  Measured at (location):  Pressure:       [x] lo [] med [] hi   Temperature: [x] lo [] med [] hi   [x] Skin assessment post-treatment: [x]intact []redness- no adverse reaction    []redness  adverse reaction:         21 min Therapeutic Exercise:  [x] See flow sheet :   Rationale: increase ROM and increase strength to improve the patients ability to perform functional task with ease. 15 min Manual Therapy: In left S/L with towel roll in axilla, performed STM to right UT, lats, and LS followed by scapular clocks. In supine, performed GR1 long axis distraction oscillations for pain control while performing ROM into flexion, scaption, abduction, ER, and IR. The manual therapy interventions were performed at a separate and distinct time from the therapeutic activities interventions. Rationale: decrease pain, increase ROM, increase tissue extensibility and decrease trigger points to improve functional mobility with          With   [] TE   [] TA   [] neuro   [] other: Patient Education: [x] Review HEP    [] Progressed/Changed HEP based on:   [] positioning   [] body mechanics   [] transfers   [] heat/ice application    [] other:      Other Objective/Functional Measures:      Pain Level (0-10 scale) post treatment: 0    ASSESSMENT/Changes in Function:   Pt continues to display improved PROM of the right shoulder and notes no pain with therex. Patient will continue to benefit from skilled PT services to modify and progress therapeutic interventions, address functional mobility deficits, address ROM deficits, address strength deficits, analyze and address soft tissue restrictions, analyze and cue movement patterns, analyze and modify body mechanics/ergonomics and assess and modify postural abnormalities to attain remaining goals. [x]  See Plan of Care  []  See progress note/recertification  []  See Discharge Summary         Progress towards goals / Updated goals:  Short Term Goals: To be accomplished in 2 weeks:  1.  Pt will report compliance with HEP to maximize therapeutic success.              Eval: HEP assigned              Current: met, reports compliance. (11/8/2021)  2. Pt will attain > 150 deg flexion PROM to progress towards improved capacity of ADLs as protocol progresses.             Eval: 145 deg post op day 2              Current: met, 160 deg (11/10/2021)  Long Term Goals: To be accomplished in 4 weeks:  1. Pt will attain > 165 deg flexion PROM to progress towards improved capacity of ADLs as protocol progresses.             Eval: 145 deg post op day 2              Current: progressing to 160 deg (11/10/2021)  2. Pt will attain or retain > 140 deg scaption PROM to progress towards improved capacity of ADLs as protocol progresses.             Eval: 140 deg on post-op day 2 with pain meds              Current: 140 deg PROM (11/10/2021)  3. Pt will attain or retain 70 deg ER PROM to progress towards improved capacity of ADLs as protocol progresses.             Eval: 70 deg on post-op day 2 with pain meds  4.  Pt will report ability to don and doff clothing and sling with independence to improve ease of self care.              Eval: modA    PLAN  []  Upgrade activities as tolerated     [x]  Continue plan of care  []  Update interventions per flow sheet       []  Discharge due to:_  []  Other:_      Low Miranda, PTA 11/12/2021  3:17 PM    Future Appointments   Date Time Provider Josiah Hernandez   11/15/2021  1:45 PM Xena Canseco, PT [facility]   11/17/2021  3:45 PM Abigail Coelho, PTA [facility]   11/19/2021  1:00 PM Danni Chang, PTA [facility]   11/22/2021  2:30 PM Danni Chang, PTA [facility]   11/23/2021 12:45 PM Abigail Coelho, PTA [facility]   11/29/2021  2:30 PM Xena Canseco, PT [facility]   12/1/2021  1:30 PM Xena Canseco, PT [facility]   12/1/2021  2:50 PM Jenny Escamilla MD [facility]   12/3/2021  2:15 PM Xnea Canseco, PT [facility]   12/6/2021  2:30 PM Xena Canseco, PT [facility]   12/8/2021  4:30 PM Xena Canseco PT [facility]   12/10/2021 12:00 PM Xena Canseco PT [facility]

## 2021-11-15 ENCOUNTER — HOSPITAL ENCOUNTER (OUTPATIENT)
Dept: PHYSICAL THERAPY | Age: 66
Discharge: HOME | End: 2021-11-15
Attending: ORTHOPAEDIC SURGERY
Payer: COMMERCIAL

## 2021-11-15 PROCEDURE — 97110 THERAPEUTIC EXERCISES: CPT

## 2021-11-15 PROCEDURE — 97140 MANUAL THERAPY 1/> REGIONS: CPT

## 2021-11-15 NOTE — PROGRESS NOTES
PT DAILY TREATMENT NOTE     Patient Name: Ligia Lipscomb  Date:11/15/2021  : 1955  [x]  Patient  Verified  Payor: Steffany Busch / Plan: Brannon Gutierrez / Product Type: HMO /    In time:145pm  Out time:238pm  Total Treatment Time (min): 48  Visit #: 5 of 12    Medicare/BCBS Only   Total Timed Codes (min):  53 1:1 Treatment Time:  38       Treatment Area: Right rotator cuff tear [M75.101]  Status post rotator cuff surgery [Z98.890]    SUBJECTIVE  Pain Level (0-10 scale): 0  Any medication changes, allergies to medications, adverse drug reactions, diagnosis change, or new procedure performed?: [x] No    [] Yes (see summary sheet for update)  Subjective functional status/changes:   [] No changes reported  Reports no pain. Still requires help from  to get the sling on. OBJECTIVE    Modality rationale: decrease edema, decrease inflammation and decrease pain to improve the patients ability to manage self care. Min Type Additional Details   10 [x]  Vasopneumatic Device    [x]  Right     []  Left  Pre-treatment girth:  Post-treatment girth:  Measured at (location):  Pressure:       [x] lo [] med [] hi   Temperature: [x] lo [] med [] hi   [] Skin assessment post-treatment:  []intact []redness- no adverse reaction    []redness  adverse reaction:     13 min Therapeutic Exercise:  [] See flow sheet :   Rationale: increase ROM to improve the patients ability to manage self care. 25 min Manual Therapy: In supine, performed GR1 long axis distraction oscillations for pain control while performing ROM into flexion, scaption, abduction, ER, and IR. STM to UT, pec minor, and suprascapularis tendon. In left S/L with towel roll in axilla, performed STM to right UT, lats, and LS followed by scapular clocks.    The manual therapy interventions were performed at a separate and distinct time from the therapeutic activities interventions.   Rationale: decrease pain, increase ROM and increase tissue extensibility to improve capacity for overhead reaching when protocol/healing allows. With   [] TE   [] TA   [] neuro   [] other: Patient Education: [x] Review HEP    [] Progressed/Changed HEP based on:   [] positioning   [] body mechanics   [] transfers   [] heat/ice application    [] other:      Other Objective/Functional Measures: ER initially tight at 25 deg, able to achieve 70 deg after 3 min of PROM with gentle oscillatory stretching     Pain Level (0-10 scale) post treatment: 0    ASSESSMENT/Changes in Function: Pt demonstrates some adaptive tightening of the external rotators today, but with progressive PROM, is able to achieve 70 deg to match initial eval measurement. Will need to emphasize this with PROM to maintain great functional ROM as protocol progresses. Patient will continue to benefit from skilled PT services to modify and progress therapeutic interventions, address functional mobility deficits, address ROM deficits, address strength deficits, analyze and address soft tissue restrictions, analyze and cue movement patterns, analyze and modify body mechanics/ergonomics and assess and modify postural abnormalities to attain remaining goals. []  See Plan of Care  []  See progress note/recertification  []  See Discharge Summary         Progress towards goals / Updated goals:  Short Term Goals: To be accomplished in 2 weeks:  1. Pt will report compliance with HEP to maximize therapeutic success.              Eval: HEP assigned              Current: met, reports compliance. (11/8/2021)  2. Pt will attain > 150 deg flexion PROM to progress towards improved capacity of ADLs as protocol progresses.             Eval: 145 deg post op day 2              Current: met, 160 deg (11/10/2021)  Long Term Goals: To be accomplished in 4 weeks:  1. Pt will attain > 165 deg flexion PROM to progress towards improved capacity of ADLs as protocol progresses.               Eval: 145 deg post op day 2              Current: progressing to 160 deg (11/10/2021)  2. Pt will attain or retain > 140 deg scaption PROM to progress towards improved capacity of ADLs as protocol progresses.             Eval: 140 deg on post-op day 2 with pain meds              Current: 140 deg PROM (11/10/2021)  3. Pt will attain or retain 70 deg ER PROM to progress towards improved capacity of ADLs as protocol progresses.             Eval: 70 deg on post-op day 2 with pain meds   Current: progressing, 70 deg after 3 min of PROM (11/15/2021)   4.  Pt will report ability to don and doff clothing and sling with independence to improve ease of self care.              Eval: modA   Current: Nas (11/15/2021)    PLAN  []  Upgrade activities as tolerated     [x]  Continue plan of care  []  Update interventions per flow sheet       []  Discharge due to:_  []  Other:_      Aaliyah Gray, PT 11/15/2021  1:56 PM    Future Appointments   Date Time Provider Josiah Hernandez   11/17/2021  3:45 PM Jairchristina Rodriguez, PTA [facility]   11/19/2021  1:00 PM Danni Chang, PTA [facility]   11/22/2021  2:30 PM Danni Chang, PTA [facility]   11/23/2021 12:45 PM Jair Carbo, PTA [facility]   11/29/2021  2:30 PM Glenard Trinity, PT [facility]   12/1/2021  1:30 PM Glenard Trinity, PT [facility]   12/1/2021  2:50 PM Luis Miguel Orr MD [facility]   12/3/2021  2:15 PM Glenard Trinity, PT [facility]   12/6/2021  2:30 PM Glenard Trinity, PT [facility]   12/8/2021  4:30 PM Glenard Trinity, PT [facility]   12/10/2021 12:00 PM Glenard Trinity, PT [facility]

## 2021-11-17 ENCOUNTER — HOSPITAL ENCOUNTER (OUTPATIENT)
Dept: PHYSICAL THERAPY | Age: 66
Discharge: HOME | End: 2021-11-17
Attending: ORTHOPAEDIC SURGERY
Payer: COMMERCIAL

## 2021-11-17 PROCEDURE — 97110 THERAPEUTIC EXERCISES: CPT

## 2021-11-17 PROCEDURE — 97140 MANUAL THERAPY 1/> REGIONS: CPT

## 2021-11-17 NOTE — PROGRESS NOTES
PT DAILY TREATMENT NOTE     Patient Name: Kennedi Birmingham  Date:2021  : 1955  [x]  Patient  Verified  Payor: Angella Guard / Plan: Shiraz Narvaez / Product Type: HMO /    In time: 3:50 Out time:4:42  Total Treatment Time (min): 52  Visit #: 6 of 12    Medicare/BCBS Only   Total Timed Codes (min):  42 1:1 Treatment Time:  42       Treatment Area: Right rotator cuff tear [M75.101]  Status post rotator cuff surgery [Z98.890]    SUBJECTIVE  Pain Level (0-10 scale): 0  Any medication changes, allergies to medications, adverse drug reactions, diagnosis change, or new procedure performed?: [x] No    [] Yes (see summary sheet for update)  Subjective functional status/changes:   [] No changes reported  Pt reports no changes since last visit. OBJECTIVE    Modality rationale: decrease inflammation and decrease pain to improve the patients ability to perform functional task with ease.    Min Type Additional Details    [] Estim:  []Unatt       []IFC  []Premod                        []Other:  []w/ice   []w/heat  Position:  Location:    [] Estim: []Att    []TENS instruct  []NMES                    []Other:  []w/US   []w/ice   []w/heat  Position:  Location:    []  Traction: [] Cervical       []Lumbar                       [] Prone          []Supine                       []Intermittent   []Continuous Lbs:  [] before manual  [] after manual    []  Ultrasound: []Continuous   [] Pulsed                           []1MHz   []3MHz W/cm2:  Location:    []  Iontophoresis with dexamethasone         Location: [] Take home patch   [] In clinic    []  Ice     []  heat  []  Ice massage  []  Laser   []  Anodyne Position:  Location:    []  Laser with stim  []  Other:  Position:  Location:   10 [x]  Vasopneumatic Device      Right     []  Left  [x]Pre-treatment girth:  Post-treatment girth:  Measured at (location):  Pressure:       [x] lo [] med [] hi   Temperature: [x] lo [] med [] hi   [] Skin assessment post-treatment: []intact []redness- no adverse reaction    []redness  adverse reaction:         26 min Therapeutic Exercise:  [x] See flow sheet :   Rationale: increase ROM and increase strength to improve the patients ability to perform functional task with ease. 16 min Manual Therapy: In left S/L with towel roll in axilla, performed STM to right UT, lats, and LS followed by scapular clocks.  In supine, performed GR1 long axis distraction oscillations for pain control while performing ROM into flexion, scaption, abduction, ER, and IR. STM to UT, pec minor, and suprascapularis tendon. The manual therapy interventions were performed at a separate and distinct time from the therapeutic activities interventions. Rationale: decrease pain, increase ROM, increase tissue extensibility and decrease trigger points to improve functional mobility with overhead reaching ADL's. With   [] TE   [] TA   [] neuro   [] other: Patient Education: [x] Review HEP    [] Progressed/Changed HEP based on:   [] positioning   [] body mechanics   [] transfers   [] heat/ice application    [] other:      Other Objective/Functional Measures:      Pain Level (0-10 scale) post treatment: 0    ASSESSMENT/Changes in Function:   Pt able to perform therex as prescribed with no increasing right shoulder pain. Minor shoulder hiking compensations noted with UT/LS stretching that decreased with correction with pt displaying good carryover. Pt continues to display improved PROM with minimal pain. No pain reported post treatment. Patient will continue to benefit from skilled PT services to modify and progress therapeutic interventions, address functional mobility deficits, address ROM deficits, address strength deficits, analyze and address soft tissue restrictions, analyze and cue movement patterns, analyze and modify body mechanics/ergonomics and assess and modify postural abnormalities to attain remaining goals.      [x]  See Plan of Care  []  See progress note/recertification  []  See Discharge Summary         Progress towards goals / Updated goals:  Short Term Goals: To be accomplished in 2 weeks:  1. Pt will report compliance with HEP to maximize therapeutic success.              Eval: HEP assigned              Current: met, reports compliance. (11/8/2021)  2. Pt will attain > 150 deg flexion PROM to progress towards improved capacity of ADLs as protocol progresses.             Eval: 145 deg post op day 2              Current: met, 160 deg (11/10/2021)  Long Term Goals: To be accomplished in 4 weeks:  1. Pt will attain > 165 deg flexion PROM to progress towards improved capacity of ADLs as protocol progresses.             Eval: 145 deg post op day 2              [id number]: progressing to 160 deg (11/10/2021)  2. Pt will attain or retain > 140 deg scaption PROM to progress towards improved capacity of ADLs as protocol progresses.             Eval: 140 deg on post-op day 2 with pain meds              Current: 140 deg PROM (11/10/2021)  3. Pt will attain or retain 70 deg ER PROM to progress towards improved capacity of ADLs as protocol progresses.             Eval: 70 deg on post-op day 2 with pain meds              Current: progressing, 70 deg after 3 min of PROM (11/15/2021)   4.  Pt will report ability to don and doff clothing and sling with independence to improve ease of self care.              Eval: modA              Current: Nas (11/15/2021)    PLAN  []  Upgrade activities as tolerated     [x]  Continue plan of care  []  Update interventions per flow sheet       []  Discharge due to:_  []  Other:_      Kris Solis, PTA 11/17/2021  3:46 PM    Future Appointments   Date Time Provider Josiah Hernandez   11/19/2021  1:00 PM Demario Camilo, PTA [facility]   11/22/2021  2:30 PM Danni Chang, PTA [facility]   11/23/2021 12:45 PM Danilo Diego, PTA [facility]   11/29/2021  2:30 PM Gonzalo Brower, PT [facility]   12/1/2021  1:30 PM Zeeshan Mcclain Juan Wright, PT [facility]   12/1/2021  2:50 PM Steven Martinez MD [facility]   12/3/2021  2:15 PM Rose Leblnac, PT [facility]   12/6/2021  2:30 PM Rose Leblanc, PT [facility]   12/8/2021  4:30 PM Rose Leblanc, PT [facility]   12/10/2021 12:00 PM Rose Leblanc, PT [facility]

## 2021-11-19 ENCOUNTER — HOSPITAL ENCOUNTER (OUTPATIENT)
Dept: PHYSICAL THERAPY | Age: 66
Discharge: HOME | End: 2021-11-19
Attending: ORTHOPAEDIC SURGERY
Payer: COMMERCIAL

## 2021-11-19 PROCEDURE — 97140 MANUAL THERAPY 1/> REGIONS: CPT

## 2021-11-19 PROCEDURE — 97110 THERAPEUTIC EXERCISES: CPT

## 2021-11-19 NOTE — PROGRESS NOTES
PT DAILY TREATMENT NOTE     Patient Name: Cali Leavitt  Date:2021  : 1955  [x]  Patient  Verified  Payor: Nafisa Cardoza / Plan: Rj Nine / Product Type: HMO /    In time:1:00  Out time:1:45  Total Treatment Time (min): 45  Visit #: 7 of 12    Medicare/BCBS Only   Total Timed Codes (min):  35 1:1 Treatment Time:  35       Treatment Area: Right rotator cuff tear [M75.101]  Status post rotator cuff surgery [Z98.890]    SUBJECTIVE  Pain Level (0-10 scale): 0  Any medication changes, allergies to medications, adverse drug reactions, diagnosis change, or new procedure performed?: [x] No    [] Yes (see summary sheet for update)  Subjective functional status/changes:   [] No changes reported  Pt reports feeling fine, no changes    OBJECTIVE    Modality rationale: decrease pain to improve the patients ability to perform daily tasks   Min Type Additional Details    [] Estim:  []Unatt       []IFC  []Premod                        []Other:  []w/ice   []w/heat  Position:  Location:    [] Estim: []Att    []TENS instruct  []NMES                    []Other:  []w/US   []w/ice   []w/heat  Position:  Location:    []  Traction: [] Cervical       []Lumbar                       [] Prone          []Supine                       []Intermittent   []Continuous Lbs:  [] before manual  [] after manual    []  Ultrasound: []Continuous   [] Pulsed                           []1MHz   []3MHz W/cm2:  Location:    []  Iontophoresis with dexamethasone         Location: [] Take home patch   [] In clinic    []  Ice     []  heat  []  Ice massage  []  Laser   []  Anodyne Position:  Location:    []  Laser with stim  []  Other:  Position:  Location:   10 [x]  Vasopneumatic Device    []  Right     [x]  Left  Pre-treatment girth:  Post-treatment girth:  Measured at (location):  Pressure:       [x] lo [] med [] hi   Temperature: [x] lo [] med [] hi   [] Skin assessment post-treatment:  []intact []redness- no adverse reaction []redness  adverse reaction:     23 min Therapeutic Exercise:  [x] See flow sheet :   Rationale: increase ROM and increase strength to improve the patients ability to perform ADLs    12 min Manual Therapy:  In S/L with towel roll in axilla: STM to parascap mm, scap clocks. In supine, gentle GH distraction, STM to UT, gentle oscillations with PROM   The manual therapy interventions were performed at a separate and distinct time from the therapeutic activities interventions. Rationale: decrease pain, increase ROM and increase tissue extensibility to perform functional tasks            With   [] TE   [] TA   [] neuro   [] other: Patient Education: [x] Review HEP    [] Progressed/Changed HEP based on:   [] positioning   [] body mechanics   [] transfers   [] heat/ice application    [] other:      Other Objective/Functional Measures:      Pain Level (0-10 scale) post treatment: 0    ASSESSMENT/Changes in Function: Patient is making good progress within protocol, denies pain throughout treatment today. Minor guarding/ shoulder hike when right arm is out of sling. Pt is compliant with HEP and CP. Patient will continue to benefit from skilled PT services to modify and progress therapeutic interventions, address functional mobility deficits, address ROM deficits, address strength deficits, analyze and address soft tissue restrictions, analyze and cue movement patterns, analyze and modify body mechanics/ergonomics and assess and modify postural abnormalities to attain remaining goals. []  See Plan of Care  []  See progress note/recertification  []  See Discharge Summary         Progress towards goals / Updated goals:  Short Term Goals: To be accomplished in 2 weeks:  1. Pt will report compliance with HEP to maximize therapeutic success.              Eval: HEP assigned              Current: met, reports compliance. (11/8/2021)  2.  Pt will attain > 150 deg flexion PROM to progress towards improved capacity of ADLs as protocol progresses.             Eval: 145 deg post op day 2              Current: met, 160 deg (11/10/2021)  Long Term Goals: To be accomplished in 4 weeks:  1. Pt will attain > 165 deg flexion PROM to progress towards improved capacity of ADLs as protocol progresses.             Eval: 145 deg post op day 2              [id number]: progressing to 160 deg (11/10/2021)  2. Pt will attain or retain > 140 deg scaption PROM to progress towards improved capacity of ADLs as protocol progresses.             Eval: 140 deg on post-op day 2 with pain meds              Current: 140 deg PROM (11/10/2021)  3. Pt will attain or retain 70 deg ER PROM to progress towards improved capacity of ADLs as protocol progresses.             Eval: 70 deg on post-op day 2 with pain meds              Current: progressing, 70 deg after 3 min of PROM (11/15/2021)   4.  Pt will report ability to don and doff clothing and sling with independence to improve ease of self care.              Eval: Zora Philippe              OFKBL: Nas (11/15/2021)    PLAN  []  Upgrade activities as tolerated     [x]  Continue plan of care  []  Update interventions per flow sheet       []  Discharge due to:_  []  Other:_      Tremaine Guy, PTA 11/19/2021  1:03 PM    Future Appointments   Date Time Provider Josiah Hernandez   11/22/2021  2:30 PM Arielle Bhatia, PTA [facility]   11/23/2021 12:45 PM Gunjan Uribe, PTA [facility]   11/29/2021  2:30 PM Javier Found, PT [facility]   12/1/2021  1:30 PM Javier Found, PT [facility]   12/1/2021  2:50 PM Melina Monday, MD [facility]   12/3/2021  2:15 PM Javier Found, PT [facility]   12/6/2021  2:30 PM Javier Found, PT [facility]   12/8/2021  4:30 PM Javier Found, PT [facility]   12/10/2021 12:00 PM Javier Found, PT [facility]

## 2021-11-22 ENCOUNTER — HOSPITAL ENCOUNTER (OUTPATIENT)
Dept: PHYSICAL THERAPY | Age: 66
Discharge: HOME | End: 2021-11-22
Attending: ORTHOPAEDIC SURGERY
Payer: COMMERCIAL

## 2021-11-22 PROCEDURE — 97016 VASOPNEUMATIC DEVICE THERAPY: CPT

## 2021-11-22 PROCEDURE — 97140 MANUAL THERAPY 1/> REGIONS: CPT

## 2021-11-22 PROCEDURE — 97110 THERAPEUTIC EXERCISES: CPT

## 2021-11-22 NOTE — PROGRESS NOTES
PT DAILY TREATMENT NOTE     Patient Name: Daquan Montoya  Date:2021  : 1955  [x]  Patient  Verified  Payor: Chanda Grace / Plan: Drew Mayo / Product Type: HMO /    In time:2:30  Out time:3:10  Total Treatment Time (min): 40  Visit #: 8 of 12    Medicare/BCBS Only   Total Timed Codes (min):  30 1:1 Treatment Time:  30       Treatment Area: Right rotator cuff tear [M75.101]  Status post rotator cuff surgery [Z98.890]    SUBJECTIVE  Pain Level (0-10 scale): 0  Any medication changes, allergies to medications, adverse drug reactions, diagnosis change, or new procedure performed?: [x] No    [] Yes (see summary sheet for update)  Subjective functional status/changes:   [] No changes reported  Pt reports feeling pain at night from the weather change but was able to take Aleve and go back to bed    OBJECTIVE    Modality rationale: decrease pain to improve the patients ability to perform daily tasks   Min Type Additional Details    [] Estim:  []Unatt       []IFC  []Premod                        []Other:  []w/ice   []w/heat  Position:  Location:    [] Estim: []Att    []TENS instruct  []NMES                    []Other:  []w/US   []w/ice   []w/heat  Position:  Location:    []  Traction: [] Cervical       []Lumbar                       [] Prone          []Supine                       []Intermittent   []Continuous Lbs:  [] before manual  [] after manual    []  Ultrasound: []Continuous   [] Pulsed                           []1MHz   []3MHz W/cm2:  Location:    []  Iontophoresis with dexamethasone         Location: [] Take home patch   [] In clinic    []  Ice     []  heat  []  Ice massage  []  Laser   []  Anodyne Position:  Location:    []  Laser with stim  []  Other:  Position:  Location:   10 [x]  Vasopneumatic Device    [x]  Right     []  Left  Pre-treatment girth:  Post-treatment girth:  Measured at (location):  Pressure:       [x] lo [] med [] hi   Temperature: [x] lo [] med [] hi   [] Skin assessment post-treatment:  []intact []redness- no adverse reaction    []redness  adverse reaction:     15 min Therapeutic Exercise:  [x] See flow sheet :   Rationale: increase ROM and increase strength to improve the patients ability to perform ADLs    15 min Manual Therapy:    In S/L with towel roll in axilla: STM to parascap mm, scap clocks. In supine, gentle GH distraction, STM to UT, gentle oscillations with PROM   The manual therapy interventions were performed at a separate and distinct time from the therapeutic activities interventions. Rationale: decrease pain, increase ROM and increase tissue extensibility to improve functional mobility           With   [] TE   [] TA   [] neuro   [] other: Patient Education: [x] Review HEP    [] Progressed/Changed HEP based on:   [] positioning   [] body mechanics   [] transfers   [] heat/ice application    [] other:      Other Objective/Functional Measures:      Pain Level (0-10 scale) post treatment: 0    ASSESSMENT/Changes in Function: Pt is progressing well per protocol. C/o some pain in the evening d/t change in weather. Demo's good tolerance to manual therapy without guarding. Patient will continue to benefit from skilled PT services to modify and progress therapeutic interventions, address functional mobility deficits, address ROM deficits, address strength deficits, analyze and address soft tissue restrictions, analyze and cue movement patterns, analyze and modify body mechanics/ergonomics and assess and modify postural abnormalities to attain remaining goals. []  See Plan of Care  []  See progress note/recertification  []  See Discharge Summary         Progress towards goals / Updated goals:  Short Term Goals: To be accomplished in 2 weeks:  1. Pt will report compliance with HEP to maximize therapeutic success.              Eval: HEP assigned              Current: met, reports compliance. (11/8/2021)  2.  Pt will attain > 150 deg flexion PROM to progress towards improved capacity of ADLs as protocol progresses.             Eval: 145 deg post op day 2              Current: met, 160 deg (11/10/2021)  Long Term Goals: To be accomplished in 4 weeks:  1. Pt will attain > 165 deg flexion PROM to progress towards improved capacity of ADLs as protocol progresses.             Eval: 145 deg post op day 2              AYYOXCC: progressing to 160 deg (11/10/2021)  2. Pt will attain or retain > 140 deg scaption PROM to progress towards improved capacity of ADLs as protocol progresses.             Eval: 140 deg on post-op day 2 with pain meds              Current: 140 deg PROM (11/10/2021)  3. Pt will attain or retain 70 deg ER PROM to progress towards improved capacity of ADLs as protocol progresses.             Eval: 70 deg on post-op day 2 with pain meds              Current: progressing, 70 deg after 3 min of PROM (11/15/2021)   4.  Pt will report ability to don and doff clothing and sling with independence to improve ease of self care.              Eval: Rosalene Slimmer              [id number]: Nas (11/15/2021)    PLAN  []  Upgrade activities as tolerated     [x]  Continue plan of care  []  Update interventions per flow sheet       []  Discharge due to:_  []  Other:_      Suzy Chang PTA 11/22/2021  2:35 PM    Future Appointments   Date Time Provider Josiah Hernandez   11/23/2021 12:45 PM Abigail Coelho, PTA [facility]   11/29/2021  2:30 PM Xena Canseco, PT [facility]   12/1/2021  1:30 PM Xena Canseco, PT [facility]   12/1/2021  2:50 PM Jenny Escamilla MD [facility]   12/3/2021  2:15 PM Xena Canseco, PT [facility]   12/6/2021  2:30 PM Xena Canseco, PT [facility]   12/8/2021  4:30 PM Xena Canseco, PT [facility]   12/10/2021 12:00 PM Xena Canseco, PT [facility]

## 2021-11-23 ENCOUNTER — HOSPITAL ENCOUNTER (OUTPATIENT)
Dept: PHYSICAL THERAPY | Age: 66
Discharge: HOME | End: 2021-11-23
Attending: ORTHOPAEDIC SURGERY
Payer: COMMERCIAL

## 2021-11-23 PROCEDURE — 97110 THERAPEUTIC EXERCISES: CPT

## 2021-11-23 PROCEDURE — 97140 MANUAL THERAPY 1/> REGIONS: CPT

## 2021-11-23 NOTE — PROGRESS NOTES
PT DAILY TREATMENT NOTE     Patient Name: Mayi Enciso  Date:2021  : 1955  [x]  Patient  Verified  Payor: Mylene Zurita / Plan: Theo Mohan / Product Type: HMO /    In time:12:51  Out time:1:40  Total Treatment Time (min): 49  Visit #: 9 of 12    Medicare/BCBS Only   Total Timed Codes (min):  39 1:1 Treatment Time:  34       Treatment Area: Right rotator cuff tear [M75.101]  Status post rotator cuff surgery [Z98.890]    SUBJECTIVE  Pain Level (0-10 scale): 0  Any medication changes, allergies to medications, adverse drug reactions, diagnosis change, or new procedure performed?: [x] No    [] Yes (see summary sheet for update)  Subjective functional status/changes:   [] No changes reported  Pt reports no pain in her right shoulder. OBJECTIVE    Modality rationale: decrease inflammation and decrease pain to improve the patients ability to tolerate post workout soreness.    Min Type Additional Details    [] Estim:  []Unatt       []IFC  []Premod                        []Other:  []w/ice   []w/heat  Position:  Location:    [] Estim: []Att    []TENS instruct  []NMES                    []Other:  []w/US   []w/ice   []w/heat  Position:  Location:    []  Traction: [] Cervical       []Lumbar                       [] Prone          []Supine                       []Intermittent   []Continuous Lbs:  [] before manual  [] after manual    []  Ultrasound: []Continuous   [] Pulsed                           []1MHz   []3MHz W/cm2:  Location:    []  Iontophoresis with dexamethasone         Location: [] Take home patch   [] In clinic    []  Ice     []  heat  []  Ice massage  []  Laser   []  Anodyne Position:  Location:    []  Laser with stim  []  Other:  Position:  Location:   10 [x]  Vasopneumatic Device    [x]  Right     []  Left  Pre-treatment girth:  Post-treatment girth:  Measured at (location):  Pressure:       [x] lo [] med [] hi   Temperature: [x] lo [] med [] hi   [x] Skin assessment post-treatment: [x]intact []redness- no adverse reaction    []redness  adverse reaction:       24 min Therapeutic Exercise:  [x] See flow sheet :   Rationale: increase ROM and increase strength to improve the patients ability to perform functional task with ease. 15 min Manual Therapy:  In S/L with towel roll in axilla: STM to parascap mm, scap clocks. In supine, gentle GH distraction, STM to UT, gentle oscillations with PROM   The manual therapy interventions were performed at a separate and distinct time from the therapeutic activities interventions. Rationale: decrease pain, increase ROM, increase tissue extensibility and decrease trigger points to improve functional mobility with overhead reaching ADL's per protocol. With   [] TE   [] TA   [] neuro   [] other: Patient Education: [x] Review HEP    [] Progressed/Changed HEP based on:   [] positioning   [] body mechanics   [] transfers   [] heat/ice application    [] other:      Other Objective/Functional Measures:      Pain Level (0-10 scale) post treatment: 0    ASSESSMENT/Changes in Function:   Good progression with treatment to the right shoulder with pt noting little to no pain, good PROM and no c/o increased pain with therex. Very minimal cueing to decrease shoulder hiking compensations with pt displaying good carryover with correction. No pain reported post treatment. Patient will continue to benefit from skilled PT services to modify and progress therapeutic interventions, address functional mobility deficits, address ROM deficits, address strength deficits, analyze and address soft tissue restrictions, analyze and cue movement patterns, analyze and modify body mechanics/ergonomics and assess and modify postural abnormalities to attain remaining goals. [x]  See Plan of Care  []  See progress note/recertification  []  See Discharge Summary         Progress towards goals / Updated goals:   Short Term Goals: To be accomplished in 2 weeks:  1.  Pt will report compliance with HEP to maximize therapeutic success.              Eval: HEP assigned              Current: met, reports compliance. (11/8/2021)  2. Pt will attain > 150 deg flexion PROM to progress towards improved capacity of ADLs as protocol progresses.             Eval: 145 deg post op day 2              Current: met, 160 deg (11/10/2021)  Long Term Goals: To be accomplished in 4 weeks:  1. Pt will attain > 165 deg flexion PROM to progress towards improved capacity of ADLs as protocol progresses.             Eval: 145 deg post op day 2              [id number]: progressing to 160 deg (11/10/2021)  2. Pt will attain or retain > 140 deg scaption PROM to progress towards improved capacity of ADLs as protocol progresses.             Eval: 140 deg on post-op day 2 with pain meds              Current: 140 deg PROM (11/10/2021)  3. Pt will attain or retain 70 deg ER PROM to progress towards improved capacity of ADLs as protocol progresses.             Eval: 70 deg on post-op day 2 with pain meds              Current: progressing, 70 deg after 3 min of PROM (11/15/2021)   4.  Pt will report ability to don and doff clothing and sling with independence to improve ease of self care.              Eval: Palma Oreillyan              VDWVHPZ: Nas (11/15/2021)    PLAN  []  Upgrade activities as tolerated     [x]  Continue plan of care  []  Update interventions per flow sheet       []  Discharge due to:_  []  Other:_      hTeodora Gallardo, PTA 11/23/2021  12:43 PM    Future Appointments   Date Time Provider Josiah Hernandez   11/23/2021 12:45 PM Galina Wheeler, PTA [facility]   11/29/2021  2:30 PM Nadeem Jose, PT [facility]   12/1/2021  1:30 PM Nadeem Jose, PT [facility]   12/1/2021  2:50 PM Priscilla Richmond MD [facility]   12/3/2021  2:15 PM Nadeem Jose, PT [facility]   12/6/2021  2:30 PM Nadeem Jose, PT [facility]   12/8/2021  4:30 PM Nadeem Jose, PT [facility]   12/10/2021 12:00 PM Castillo Espinosa, Jeannine Ocampo, PT [facility]

## 2021-11-29 ENCOUNTER — HOSPITAL ENCOUNTER (OUTPATIENT)
Dept: PHYSICAL THERAPY | Age: 66
Discharge: HOME | End: 2021-11-29
Attending: ORTHOPAEDIC SURGERY
Payer: COMMERCIAL

## 2021-11-29 PROCEDURE — 97140 MANUAL THERAPY 1/> REGIONS: CPT

## 2021-11-29 PROCEDURE — 97110 THERAPEUTIC EXERCISES: CPT

## 2021-11-29 NOTE — PROGRESS NOTES
PT DAILY TREATMENT NOTE     Patient Name: Michael Uribe  Date:2021  : 1955  [x]  Patient  Verified  Payor: Violet Villalpando / Plan: Michelle Whalen / Product Type: HMO /    In time:230pm  Out time:323pm  Total Treatment Time (min): 53  Visit #: 10 of 12    Medicare/BCBS Only   Total Timed Codes (min):  53 1:1 Treatment Time:  40       Treatment Area: Right rotator cuff tear [M75.101]  Status post rotator cuff surgery [Z98.890]    SUBJECTIVE  Pain Level (0-10 scale): 0  Any medication changes, allergies to medications, adverse drug reactions, diagnosis change, or new procedure performed?: [x] No    [] Yes (see summary sheet for update)  Subjective functional status/changes:   [] No changes reported  Reports some tenderness at the lateral elbow today but otherwise no shoulder pain. OBJECTIVE    Modality rationale: decrease edema, decrease inflammation and decrease pain to improve the patients ability to manage self care. Min Type Additional Details   10 [x]  Vasopneumatic Device    [x]  Right     []  Left  Pre-treatment girth:  Post-treatment girth:  Measured at (location):  Pressure:       [x] lo [] med [] hi   Temperature: [x] lo [] med [] hi   [] Skin assessment post-treatment:  []intact []redness- no adverse reaction    []redness  adverse reaction:     15 min Therapeutic Exercise:  [x] See flow sheet :   Rationale: increase ROM to improve the patients ability to manage self care. 25 min Manual Therapy:  Progressive PROM with overpressure oscillations into soft tissue restrictions into ER, flexion, scaption, and ABD *pain free. * Performed GR II/III GENTLE anterior, distraction, and inferior oscillations to assist with reducing capsular and joint tightness at end ranges. In left S/L, performed STM to the lats, infraspinatus, and scapular clocks.     The manual therapy interventions were performed at a separate and distinct time from the therapeutic activities interventions. Rationale: decrease pain, increase ROM and increase tissue extensibility to improve capacity for overhead reaching when protocol/healing allows. With   [] TE   [] TA   [] neuro   [] other: Patient Education: [x] Review HEP    [] Progressed/Changed HEP based on:   [] positioning   [] body mechanics   [] transfers   [] heat/ice application    [] other:      Other Objective/Functional Measures: see PN     Pain Level (0-10 scale) post treatment: 0    ASSESSMENT/Changes in Function: Pt is progressing excellently with her therapy, demonstrating improving ROM in all planes without pain provocation. Pt remains compliant with HEP and with sling usage. Pleasure to work with. She will continue to benefit from skilled PT services to progress per surgeon's protocol and independence with ADLs and IADLs. Patient will continue to benefit from skilled PT services to modify and progress therapeutic interventions, address functional mobility deficits, address ROM deficits, address strength deficits, analyze and address soft tissue restrictions, analyze and cue movement patterns, analyze and modify body mechanics/ergonomics, assess and modify postural abnormalities and instruct in home and community integration to attain remaining goals. []  See Plan of Care  []  See progress note/recertification  []  See Discharge Summary         Progress towards goals / Updated goals:  Short Term Goals: To be accomplished in 2 weeks:  1. Pt will report compliance with HEP to maximize therapeutic success.              Eval: HEP assigned              Current: met, reports compliance. (11/8/2021)  2. Pt will attain > 150 deg flexion PROM to progress towards improved capacity of ADLs as protocol progresses.             Eval: 145 deg post op day 2              Current: met, 160 deg (11/10/2021)  Long Term Goals: To be accomplished in 4 weeks:  1.  Pt will attain > 165 deg flexion PROM to progress towards improved capacity of ADLs as protocol progresses.             Eval: 145 deg post op day 2              MIQUHCY: met, 168 deg (11/29/2021)  2. Pt will attain or retain > 140 deg scaption PROM to progress towards improved capacity of ADLs as protocol progresses.             Eval: 140 deg on post-op day 2 with pain meds   Current: 155 deg scaption PROM, 140 deg ABD ROM (11/29/2021)  3. Pt will attain or retain 70 deg ER PROM to progress towards improved capacity of ADLs as protocol progresses.             Eval: 70 deg on post-op day 2 with pain meds              Current: progressing, 70 deg after 3 min of PROM (11/15/2021)    Current: met, 70 deg after 10 minutes of STM and PROM (50 deg initial after getting out of sling) (11/29/2021)  4.  Pt will report ability to don and doff clothing and sling with independence to improve ease of self care.              Eval: Cheryl Limb              [id number]: Nas (11/29/2021)    PLAN  []  Upgrade activities as tolerated     [x]  Continue plan of care  []  Update interventions per flow sheet       []  Discharge due to:_  []  Other:_      Roselia January, PT 11/29/2021  2:31 PM    Future Appointments   Date Time Provider Josiah Hernandez   12/1/2021  1:30 PM Mirta Samuels PT [facility]   12/1/2021  2:50 PM Galo Huber MD [facility]   12/3/2021  2:15 PM Mirta Samuels PT [facility]   12/6/2021  2:30 PM Mirta Samuels PT [facility]   12/8/2021  4:30 PM Mirta Samuels PT [facility]   12/10/2021 12:00 PM Mirta Samuels, PT [facility]

## 2021-11-29 NOTE — PROGRESS NOTES
[facility]  [address]  [place], [address]  [phone] fax    Physical Therapy Progress Note  Patient name: Cali Leavitt Start of Care: 2021   Referral source: Heidi Maxwell : 1955   Medical/Treatment Diagnosis: Right rotator cuff tear [M75.101]  Status post rotator cuff surgery [Z98.890]  Payor: BLUE CROSS / Plan: Lonoke Nine / Product Type: HMO /  Onset Date:11/3/2021 DOS     Prior Hospitalization: see medical history Provider#: [id number]   Medications: Verified on Patient Summary List    Comorbidities: tobacco use, BMI > 30    Prior Level of Function: some diffuse shoulder pain prior to surgery but independence ADLs and self care. Visits from Start of Care: 10    Missed Visits: 0    Established Goals:          Short Term Goals: To be accomplished in 2 weeks:  1. Pt will report compliance with HEP to maximize therapeutic success.              Eval: HEP assigned              Current: met, reports compliance. (2021)  2. Pt will attain > 150 deg flexion PROM to progress towards improved capacity of ADLs as protocol progresses.             Eval: 145 deg post op day 2              Current: met, 160 deg (11/10/2021)  Long Term Goals: To be accomplished in 4 weeks:  1. Pt will attain > 165 deg flexion PROM to progress towards improved capacity of ADLs as protocol progresses.             Eval: 145 deg post op day 2              ALMNBUT: met, 168 deg (2021)  2. Pt will attain or retain > 140 deg scaption PROM to progress towards improved capacity of ADLs as protocol progresses.             Eval: 140 deg on post-op day 2 with pain meds   Current: 155 deg scaption PROM, 140 deg ABD ROM (2021)  3. Pt will attain or retain 70 deg ER PROM to progress towards improved capacity of ADLs as protocol progresses.               Eval: 70 deg on post-op day 2 with pain meds              Current: progressing, 70 deg after 3 min of PROM (11/15/2021)    Current: met, 70 deg after 10 minutes of STM and PROM (50 deg initial after getting out of sling) (11/29/2021)  4. Pt will report ability to don and doff clothing and sling with independence to improve ease of self care.              Eval: Marshall              WKHVEKV: Nas (11/29/2021)    Key Functional Changes: ER 70 deg, flexion 168 deg, abd 140 deg, scaption 155 deg    Updated Goals: to be achieved in 4 weeks:  1. Pt will attain > 165 deg flexion AROM to progress towards improved capacity of ADLs as protocol progresses.             PN: 168 deg PROM  2. Pt will attain or retain > 140 deg scaption AROM to progress towards improved capacity of ADLs as protocol progresses. PN: 155 deg scaption PROM, 140 deg ABD PROM   3. Pt will attain or retain 70 deg ER AROM to progress towards improved capacity of ADLs as protocol progresses. PN: 70 deg after 10 minutes of STM and PROM (50 deg initial after getting out of sling)  4.  Pt will report ability to don and doff clothing and sling with independence to improve ease of self care.              PN: Nas     ASSESSMENT/RECOMMENDATIONS:  [x]Continue therapy per initial plan/protocol at a frequency of  2-3 x per week for 4 weeks  []Continue therapy with the following recommended changes:_____________________      _____________________________________________________________________  []Discontinue therapy progressing towards or have reached established goals  []Discontinue therapy due to lack of appreciable progress towards goals  []Discontinue therapy due to lack of attendance or compliance  []Await Physician's recommendations/decisions regarding therapy  []Other:________________________________________________________________    Thank you for this referral.    Jeremi Moreira, PT 11/29/2021 3:27 PM  NOTE TO PHYSICIAN:  PLEASE COMPLETE THE ORDERS BELOW AND   FAX TO [facility]: [phone]  If you are unable to process this request in 24 hours please contact our office: [phone]    ? I have read the above report and request that my patient continue as recommended. ? I have read the above report and request that my patient continue therapy with the following changes/special instructions:__________________________________________________________  ? I have read the above report and request that my patient be discharged from therapy.     Physicians signature: ______________________________Date: ______Time:______     Jenny Escamilla,*

## 2021-12-01 ENCOUNTER — APPOINTMENT (OUTPATIENT)
Dept: PHYSICAL THERAPY | Age: 66
End: 2021-12-01
Attending: ORTHOPAEDIC SURGERY
Payer: COMMERCIAL

## 2021-12-01 ENCOUNTER — OFFICE VISIT (OUTPATIENT)
Dept: ORTHOPEDIC SURGERY | Age: 66
End: 2021-12-01
Payer: COMMERCIAL

## 2021-12-01 VITALS — TEMPERATURE: 97.6 F

## 2021-12-01 DIAGNOSIS — M75.121 NONTRAUMATIC COMPLETE TEAR OF RIGHT ROTATOR CUFF: Primary | ICD-10-CM

## 2021-12-01 PROCEDURE — 99024 POSTOP FOLLOW-UP VISIT: CPT | Performed by: ORTHOPAEDIC SURGERY

## 2021-12-01 NOTE — PROGRESS NOTES
Panfilo Cintron  1955     HISTORY OF PRESENT ILLNESS  Panfilo Cintron is a 61 y.o. female who presents today for evaluation s/p Right shoulder arthroscopic rotator cuff repair performed on 11/3/2021. Patient has been going to PT. Describes pain as a 0/10. Has been taking no meds for pain. She notes some swelling but overall has improved. Still has night pain. Patient denies any fever, chills, chest pain, shortness of breath or calf pain. There are no new illness or injuries to report since last seen in the office. PHYSICAL EXAM:   Visit Vitals  Temp 97.6 °F (36.4 °C) (Temporal)      The patient is a well-developed, well-nourished female in no acute distress. The patient is alert and oriented times three. The patient appears to be well groomed. Mood and affect are normal.  ORTHOPEDIC EXAM of right shoulder:  Inspection: swelling not present,  Bruising not present  Incision, clean, dry, intact, sutures in place  Passive glenohumeral abduction 0-90 degrees  Stability: Stable  Strength: n/a  2+ distal pulses    IMPRESSION:  S/P Right shoulder arthroscopic rotator cuff repair performed on 11/3/2021. PLAN:   Pt has improved and is doing well today. Start Active assist for the next 2 weeks and AROM after 2 weeks. Return in 4 weeks. Scribed by Ed Larkin ([facility]) as dictated by MD SAVANNAH Mustafa, Dr. Jaqueline Hernandez, confirm that all documentation is accurate.     Jaqueline Hernandez M.D.   [facility]

## 2021-12-02 ENCOUNTER — HOSPITAL ENCOUNTER (OUTPATIENT)
Dept: PHYSICAL THERAPY | Age: 66
Discharge: HOME | End: 2021-12-02
Attending: ORTHOPAEDIC SURGERY
Payer: COMMERCIAL

## 2021-12-02 PROCEDURE — 97110 THERAPEUTIC EXERCISES: CPT

## 2021-12-02 PROCEDURE — 97140 MANUAL THERAPY 1/> REGIONS: CPT

## 2021-12-02 NOTE — PROGRESS NOTES
PT DAILY TREATMENT NOTE     Patient Name: Trisha Menard  Date:2021  : 1955  [x]  Patient  Verified  Payor: Darshan Coates / Plan: Iona Morel / Product Type: HMO /    In time:10:53  Out time:11:40  Total Treatment Time (min): 52  Visit #: 1 of 8    Medicare/BCBS Only   Total Timed Codes (min):  37 1:1 Treatment Time:  37       Treatment Area: Right rotator cuff tear [M75.101]  Status post rotator cuff surgery [Z98.890]    SUBJECTIVE  Pain Level (0-10 scale): 0  Any medication changes, allergies to medications, adverse drug reactions, diagnosis change, or new procedure performed?: [x] No    [] Yes (see summary sheet for update)  Subjective functional status/changes:   [] No changes reported  Pt states she saw her MD yesterday for a f/u about her right shoulder and he was pleased with her progress. OBJECTIVE    Modality rationale: decrease inflammation and decrease pain to improve the patients ability to tolerate post workout soreness.    Min Type Additional Details    [] Estim:  []Unatt       []IFC  []Premod                        []Other:  []w/ice   []w/heat  Position:  Location:    [] Estim: []Att    []TENS instruct  []NMES                    []Other:  []w/US   []w/ice   []w/heat  Position:  Location:    []  Traction: [] Cervical       []Lumbar                       [] Prone          []Supine                       []Intermittent   []Continuous Lbs:  [] before manual  [] after manual    []  Ultrasound: []Continuous   [] Pulsed                           []1MHz   []3MHz W/cm2:  Location:    []  Iontophoresis with dexamethasone         Location: [] Take home patch   [] In clinic    []  Ice     []  heat  []  Ice massage  []  Laser   []  Anodyne Position:  Location:    []  Laser with stim  []  Other:  Position:  Location:   10 [x]  Vasopneumatic Device    [x]  Right     []  Left  Pre-treatment girth:  Post-treatment girth:  Measured at (location):  Pressure:       [] lo [] med [] hi Temperature: [] lo [] med [] hi   [] Skin assessment post-treatment:  []intact []redness- no adverse reaction    []redness  adverse reaction:       27 min Therapeutic Exercise:  [x] See flow sheet :   Rationale: increase ROM, increase strength and improve coordination to improve the patients ability to perform functional task with ease. 10 min Manual Therapy:  Progressive PROM with overpressure oscillations into soft tissue restrictions into ER, flexion, scaption, and ABD *pain free. * Performed GR II/III GENTLE anterior, distraction, and inferior oscillations to assist with reducing capsular and joint tightness at end ranges. In left S/L, performed STM to the lats, infraspinatus, and scapular clocks. The manual therapy interventions were performed at a separate and distinct time from the therapeutic activities interventions. Rationale: decrease pain, increase ROM and increase tissue extensibility to improve functional mobility with overhead reaching ADL's per protocol. With   [] TE   [] TA   [] neuro   [] other: Patient Education: [x] Review HEP    [] Progressed/Changed HEP based on:   [] positioning   [] body mechanics   [] transfers   [] heat/ice application    [] other:      Other Objective/Functional Measures: Pt progressed to phase II of her protocol per her MD.  Croftonkatheryn Rooney (flex/scap)- 2\" ea    Pain Level (0-10 scale) post treatment: 0    ASSESSMENT/Changes in Function:   Pt progressed to the next phase of her protocol this visit with the initiation of Pulleys and stool scoots at the table. Pt tolerated the progression to her next phase well with no reports of increased pain and displaying good ROM. Pt's MD request the start to active assist for 2 weeks and then progress to AROM for another 2 weeks. Pt continues to display good right shoulder ROM and reports no pain post treatment.     Patient will continue to benefit from skilled PT services to modify and progress therapeutic interventions, address functional mobility deficits, address ROM deficits, address strength deficits, analyze and address soft tissue restrictions, analyze and cue movement patterns, analyze and modify body mechanics/ergonomics and assess and modify postural abnormalities to attain remaining goals. [x]  See Plan of Care  []  See progress note/recertification  []  See Discharge Summary         Progress towards goals / Updated goals:  Updated Goals: to be achieved in 4 weeks:  1. Pt will attain > 165 deg flexion AROM to progress towards improved capacity of ADLs as protocol progresses.             PN: 168 deg PROM  2. Pt will attain or retain > 140 deg scaption AROM to progress towards improved capacity of ADLs as protocol progresses. PN: 155 deg scaption PROM, 140 deg ABD PROM   3. Pt will attain or retain 70 deg ER AROM to progress towards improved capacity of ADLs as protocol progresses. PN: 70 deg after 10 minutes of STM and PROM (50 deg initial after getting out of sling)  4.  Pt will report ability to don and doff clothing and sling with independence to improve ease of self care.              PN: Nas     PLAN  []  Upgrade activities as tolerated     [x]  Continue plan of care  []  Update interventions per flow sheet       []  Discharge due to:_  []  Other:_      Yung Na, PTA 12/2/2021  11:08 AM    Future Appointments   Date Time Provider Josiah Hernandez   12/3/2021  2:15 PM Glenard Trinity, PT [facility]   12/6/2021  2:30 PM Glenard Trinity, PT [facility]   12/8/2021  4:30 PM Glenard Trinity, PT [facility]   12/10/2021 12:00 PM Glenard Trinity, PT [facility]   12/13/2021  2:30 PM Danni Chang A, PTA [facility]   12/15/2021  3:45 PM Glenard Trinity, PT [facility]   12/17/2021  3:45 PM Jair Carbo, PTA [facility]   12/20/2021  3:45 PM Jair Carbo, PTA [facility]   12/22/2021  3:45 PM Jair Carbo, PTA [facility]   12/27/2021  3:45 PM Jair Carbo, PTA [facility] 12/29/2021  3:15 PM TANNER Delarosa [facility]   12/29/2021  3:45 PM Meg Pineda, PT [facility]

## 2021-12-03 ENCOUNTER — HOSPITAL ENCOUNTER (OUTPATIENT)
Dept: PHYSICAL THERAPY | Age: 66
Discharge: HOME | End: 2021-12-03
Attending: ORTHOPAEDIC SURGERY
Payer: COMMERCIAL

## 2021-12-03 PROCEDURE — 97140 MANUAL THERAPY 1/> REGIONS: CPT

## 2021-12-03 PROCEDURE — 97110 THERAPEUTIC EXERCISES: CPT

## 2021-12-03 NOTE — PROGRESS NOTES
PT DAILY TREATMENT NOTE     Patient Name: Lucie Nascimento  Date:12/3/2021  : 1955  [x]  Patient  Verified  Payor: Merline Grow / Plan: Sae Magaña / Product Type: HMO /    In time:218pm  Out time:310pm  Total Treatment Time (min): 46  Visit #: 2 of     Medicare/[facility] Only   Total Timed Codes (min):  52 1:1 Treatment Time:  42       Treatment Area: Right rotator cuff tear [M75.101]  Status post rotator cuff surgery [Z98.890]    SUBJECTIVE  Pain Level (0-10 scale): 0  Any medication changes, allergies to medications, adverse drug reactions, diagnosis change, or new procedure performed?: [x] No    [] Yes (see summary sheet for update)  Subjective functional status/changes:   [] No changes reported  Reports no adverse reaction to addition of light AAROM activities at last visit. OBJECTIVE    Modality rationale: decrease edema, decrease inflammation and decrease pain to improve the patients ability to manage self care. Min Type Additional Details   10 [x]  Vasopneumatic Device    [x]  Right     []  Left  Pre-treatment girth:  Post-treatment girth:  Measured at (location):  Pressure:       [x] lo [] med [] hi   Temperature: [x] lo [] med [] hi   [] Skin assessment post-treatment:  []intact []redness- no adverse reaction    []redness  adverse reaction:     32 min Therapeutic Exercise:  [x] See flow sheet :   Rationale: increase ROM, increase strength, improve coordination and increase proprioception to improve the patients ability to perform ADLs and self care tasks with improved independence per protocol restrictions. 10 min Manual Therapy: In supine, GR III distraction oscillations in full available GH flexion followed by overpressure stretching into GH flexion. GR II/III inferior oscillations in 90 deg scaption and GR II/III posterior oscillations in loose pack positioning. In S/L performed STM to lats, infraspinatus, and rhomboids followed by scapular clocks.     The manual therapy interventions were performed at a separate and distinct time from the therapeutic activities interventions. Rationale: decrease pain, increase ROM and increase tissue extensibility to improve capacity for overhead reaching when protocol/healing allows. With   [] TE   [] TA   [] neuro   [] other: Patient Education: [x] Review HEP    [] Progressed/Changed HEP based on:   [] positioning   [] body mechanics   [] transfers   [] heat/ice application    [] other:      Other Objective/Functional Measures: flexion PROM 170 deg with firm end feel, abd PROM 175 deg, ER 90/90 measures 75 deg     Pain Level (0-10 scale) post treatment: 0    ASSESSMENT/Changes in Function: Pt is making excellent progress with her ROM and tolerates the addition of scaption and ER with table slides without pain. Full 90/90 ER is measured today and near full abd > flexion. Will continue to address soft tissue restrictions to achieve full flexion and progress AAROM slowly per flowsheet. Patient will continue to benefit from skilled PT services to modify and progress therapeutic interventions, address functional mobility deficits, address ROM deficits, address strength deficits, analyze and address soft tissue restrictions, analyze and cue movement patterns, analyze and modify body mechanics/ergonomics and assess and modify postural abnormalities to attain remaining goals. []  See Plan of Care  []  See progress note/recertification  []  See Discharge Summary         Progress towards goals / Updated goals:  1. Pt will attain > 165 deg flexion AROM to progress towards improved capacity of ADLs as protocol progresses.             PN: 168 deg PROM  2. Pt will attain or retain > 140 deg scaption AROM to progress towards improved capacity of ADLs as protocol progresses.             PN: 155 deg scaption PROM, 140 deg ABD PROM   3.  Pt will attain or retain 70 deg ER AROM to progress towards improved capacity of ADLs as protocol progresses.             PN: 70 deg after 10 minutes of STM and PROM (50 deg initial after getting out of sling)   Current: AAROM 75 deg without manual therapy. (12/3/2021)  4. Pt will report ability to don and doff clothing and sling with independence to improve ease of self care.              PN: Nas    Current: GOAL COMPLETE/DISCONTINUED -- pt d/c sling at home, only dons for community use with  aNs. (12/3/2021)  5. Pt will improve her FOTO to 58, demonstrating improved functional ADL capacity.    PN: 43    PLAN  []  Upgrade activities as tolerated     [x]  Continue plan of care  []  Update interventions per flow sheet       []  Discharge due to:_  []  Other:_      Richar Young, PT 12/3/2021  2:22 PM    Future Appointments   Date Time Provider Josiah Hernandez   12/6/2021  2:30 PM Meg Pineda, PT [facility]   12/8/2021  4:30 PM Meg Pineda, PT [facility]   12/10/2021 12:00 PM Meg Pineda, PT [facility]   12/13/2021  2:30 PM Danni Chang, PTA [facility]   12/15/2021  3:45 PM Meg Pineda, PT [facility]   12/17/2021  3:45 PM Yadi Joshua, PTA [facility]   12/20/2021  3:45 PM Yadi Joshua, PTA [facility]   12/22/2021  3:45 PM Yadi Joshua, PTA [facility]   12/27/2021  3:45 PM Yadi Joshua, PTA [facility]   12/29/2021  3:15 PM TANNER Delarosa [facility]   12/29/2021  3:45 PM Meg Pineda, PT [facility]

## 2021-12-06 ENCOUNTER — HOSPITAL ENCOUNTER (OUTPATIENT)
Dept: PHYSICAL THERAPY | Age: 66
Discharge: HOME | End: 2021-12-06
Attending: ORTHOPAEDIC SURGERY
Payer: COMMERCIAL

## 2021-12-06 PROCEDURE — 97110 THERAPEUTIC EXERCISES: CPT

## 2021-12-06 PROCEDURE — 97140 MANUAL THERAPY 1/> REGIONS: CPT

## 2021-12-06 NOTE — PROGRESS NOTES
PT DAILY TREATMENT NOTE     Patient Name: Beronica Horton  Date:2021  : 1955  [x]  Patient  Verified  Payor: Drake Alaniz / Plan: Delta Dust / Product Type: HMO /    In time:145pm  Out time:240pm  Total Treatment Time (min): 54  Visit #: 3 of 12    Medicare/BCBS Only   Total Timed Codes (min):  55 1:1 Treatment Time:  40       Treatment Area: Right rotator cuff tear [M75.101]  Status post rotator cuff surgery [Z98.890]    SUBJECTIVE  Pain Level (0-10 scale): 0  Any medication changes, allergies to medications, adverse drug reactions, diagnosis change, or new procedure performed?: [x] No    [] Yes (see summary sheet for update)  Subjective functional status/changes:   [] No changes reported  Pt reports going to a dinner tonight and asks if it's okay to take the sling off in the restaurant. Therapist affirmed that this is okay as long as she is sitting and not jostling it much, explaining her current AAROM phase of rehab. OBJECTIVE    Modality rationale: decrease edema, decrease inflammation and decrease pain to improve the patients ability to manage self care. Min Type Additional Details   10 [x]  Vasopneumatic Device    [x]  Right     []  Left  Pre-treatment girth:  Post-treatment girth:  Measured at (location):  Pressure:       [x] lo [] med [] hi   Temperature: [x] lo [] med [] hi   [] Skin assessment post-treatment:  []intact []redness- no adverse reaction    []redness  adverse reaction:      30 min Therapeutic Exercise:  [x] See flow sheet :   Rationale: increase ROM, increase strength, improve coordination and increase proprioception to improve the patients ability to perform ADLs and self care tasks with improved independence per protocol restrictions. 10 min Manual Therapy: In supine, GR III distraction oscillations in full available GH flexion followed by overpressure stretching into GH flexion.  GR II/III inferior oscillations in 90 deg scaption and GR II/III posterior oscillations in loose pack positioning. In S/L performed STM to lats, infraspinatus, and rhomboids followed by scapular clocks. The manual therapy interventions were performed at a separate and distinct time from the therapeutic activities interventions. Rationale: decrease pain, increase ROM and increase tissue extensibility to improve capacity for overhead reaching when protocol/healing allows. With   [] TE   [] TA   [] neuro   [] other: Patient Education: [x] Review HEP    [] Progressed/Changed HEP based on:   [] positioning   [] body mechanics   [] transfers   [] heat/ice application    [] other:      Other Objective/Functional Measures: exercises per flowsheet     Pain Level (0-10 scale) post treatment: 0    ASSESSMENT/Changes in Function: Pt has discomfort at her lateral brachium with palpation and touch, and this appears consistent with biceps tendonitis pathology. Will be mindful of this with manual therapy, as this did cause discomfort for her. No discomfort at the intertubercular sulcus, incisions, or RTC tendons/muscles. Pt had no pain with transition to AAROM exercises today. Patient will continue to benefit from skilled PT services to modify and progress therapeutic interventions, address functional mobility deficits, address ROM deficits, address strength deficits, analyze and address soft tissue restrictions, analyze and cue movement patterns, analyze and modify body mechanics/ergonomics and assess and modify postural abnormalities to attain remaining goals. []  See Plan of Care  []  See progress note/recertification  []  See Discharge Summary         Progress towards goals / Updated goals:  1. Pt will attain > 165 deg flexion AROM to progress towards improved capacity of ADLs as protocol progresses.             PN: 168 deg PROM   Current: 165 deg AAROM (12/6/2021)  2.  Pt will attain or retain > 140 deg scaption AROM to progress towards improved capacity of ADLs as protocol progresses.             PN: 155 deg scaption PROM, 140 deg ABD PROM   3. Pt will attain or retain 70 deg ER AROM to progress towards improved capacity of ADLs as protocol progresses.             PN: 70 deg after 10 minutes of STM and PROM (50 deg initial after getting out of sling)              Current: AAROM 75 deg without manual therapy. (12/3/2021)  4. Pt will report ability to don and doff clothing and sling with independence to improve ease of self care.              PN: Nas               Current: GOAL COMPLETE/DISCONTINUED -- pt d/c sling at home, only dons for community use with  Nas. (12/3/2021)  5. Pt will improve her FOTO to 58, demonstrating improved functional ADL capacity.               PN: 43    PLAN  []  Upgrade activities as tolerated     [x]  Continue plan of care  []  Update interventions per flow sheet       []  Discharge due to:_  []  Other:_      Allyson Loo, PT 12/6/2021  1:54 PM    Future Appointments   Date Time Provider Josiah Hernandez   12/8/2021  1:45 PM Kelli Pavon, PTA [facility]   12/10/2021 12:00 PM Do Rasmussen, PT [facility]   12/13/2021  2:30 PM Danni Chang, PTA [facility]   12/15/2021  3:45 PM Do Rasmussen, PT [facility]   12/17/2021  3:45 PM Talisha Flair, PTA [facility]   12/20/2021  3:45 PM Talisha Flair, PTA [facility]   12/22/2021  3:45 PM Talisha Flair, PTA [facility]   12/27/2021  3:45 PM Talisha Flair, PTA [facility]   12/29/2021  3:15 PM TANNER Frausto [facility]   12/29/2021  3:45 PM Do Rasmussen, PT [facility]

## 2021-12-08 ENCOUNTER — HOSPITAL ENCOUNTER (OUTPATIENT)
Dept: PHYSICAL THERAPY | Age: 66
Discharge: HOME | End: 2021-12-08
Attending: ORTHOPAEDIC SURGERY
Payer: COMMERCIAL

## 2021-12-08 PROCEDURE — 97110 THERAPEUTIC EXERCISES: CPT

## 2021-12-08 PROCEDURE — 97140 MANUAL THERAPY 1/> REGIONS: CPT

## 2021-12-08 NOTE — PROGRESS NOTES
PT DAILY TREATMENT NOTE     Patient Name: Jordy Causey  Date:2021  : 1955  [x]  Patient  Verified  Payor: Dash Deal / Plan: Everett Reynoso / Product Type: HMO /    In time:1:48  Out time:2:40  Total Treatment Time (min): 52  Visit #: 4 of 12    Medicare/BCBS Only   Total Timed Codes (min):  42 1:1 Treatment Time:  42       Treatment Area: Right rotator cuff tear [M75.101]  Status post rotator cuff surgery [Z98.890]    SUBJECTIVE  Pain Level (0-10 scale): 0  Any medication changes, allergies to medications, adverse drug reactions, diagnosis change, or new procedure performed?: [x] No    [] Yes (see summary sheet for update)  Subjective functional status/changes:   [] No changes reported  Pt reports arm/shoulder was aching all night until is started raining and then the pain went away and she was able to sleep    OBJECTIVE    Modality rationale: decrease pain to improve the patients ability to perform daily tasks   Min Type Additional Details    [] Estim:  []Unatt       []IFC  []Premod                        []Other:  []w/ice   []w/heat  Position:  Location:    [] Estim: []Att    []TENS instruct  []NMES                    []Other:  []w/US   []w/ice   []w/heat  Position:  Location:    []  Traction: [] Cervical       []Lumbar                       [] Prone          []Supine                       []Intermittent   []Continuous Lbs:  [] before manual  [] after manual    []  Ultrasound: []Continuous   [] Pulsed                           []1MHz   []3MHz W/cm2:  Location:    []  Iontophoresis with dexamethasone         Location: [] Take home patch   [] In clinic    []  Ice     []  heat  []  Ice massage  []  Laser   []  Anodyne Position:  Location:    []  Laser with stim  []  Other:  Position:  Location:   10 [x]  Vasopneumatic Device    [x]  Right     []  Left  Pre-treatment girth:  Post-treatment girth:  Measured at (location):  Pressure:       [x] lo [] med [] hi   Temperature: [x] lo [] med [] hi   [] Skin assessment post-treatment:  []intact []redness- no adverse reaction    []redness  adverse reaction:     32 min Therapeutic Exercise:  [x] See flow sheet :   Rationale: increase ROM and increase strength to improve the patients ability to perform     10 min Manual Therapy: In supine, grade 2-3 post/inf GH mob, gentle GH distraction with oscillations. STM to UT and bicep   The manual therapy interventions were performed at a separate and distinct time from the therapeutic activities interventions. Rationale: decrease pain, increase ROM and increase tissue extensibility to improve functional mobility            With   [] TE   [] TA   [] neuro   [] other: Patient Education: [x] Review HEP    [] Progressed/Changed HEP based on:   [] positioning   [] body mechanics   [] transfers   [] heat/ice application    [] other:      Other Objective/Functional Measures:      Pain Level (0-10 scale) post treatment: 0    ASSESSMENT/Changes in Function: No difficulty with added supine wand flex and finger ladder scap. TTP @ left bicep otherwise manual therapy is painfree. Patient will continue to benefit from skilled PT services to modify and progress therapeutic interventions, address functional mobility deficits, address ROM deficits, address strength deficits, analyze and address soft tissue restrictions, analyze and cue movement patterns, analyze and modify body mechanics/ergonomics and assess and modify postural abnormalities to attain remaining goals. []  See Plan of Care  []  See progress note/recertification  []  See Discharge Summary         Progress towards goals / Updated goals:  1. Pt will attain > 165 deg flexion AROM to progress towards improved capacity of ADLs as protocol progresses.             PN: 168 deg PROM              Current: 165 deg AAROM (12/6/2021)  2.  Pt will attain or retain > 140 deg scaption AROM to progress towards improved capacity of ADLs as protocol progresses.             PN: 155 deg scaption PROM, 140 deg ABD PROM   3. Pt will attain or retain 70 deg ER AROM to progress towards improved capacity of ADLs as protocol progresses.             PN: 70 deg after 10 minutes of STM and PROM (50 deg initial after getting out of sling)              [id number]: AAROM 75 deg without manual therapy. (12/3/2021)  4. Pt will report ability to don and doff clothing and sling with independence to improve ease of self care.              PN: Nas               FRIDA: GOAL COMPLETE/DISCONTINUED -- pt d/c sling at home, only dons for community use with  Nas. (12/3/2021)  5. Pt will improve her FOTO to 58, demonstrating improved functional ADL capacity.               PN: 42    PLAN  []  Upgrade activities as tolerated     [x]  Continue plan of care  []  Update interventions per flow sheet       []  Discharge due to:_  []  Other:_      Mozell Men, PTA 12/8/2021  1:50 PM    Future Appointments   Date Time Provider Josiah Hernandez   12/10/2021 12:00 PM Rob Marx, PT [facility]   12/13/2021  2:30 PM Danni Chang, PTA [facility]   12/15/2021  3:45 PM Rob Marx, PT [facility]   12/17/2021  3:45 PM [place] Back, PTA [facility]   12/20/2021  3:45 PM [place] Back, PTA [facility]   12/22/2021  3:45 PM [place] Back, PTA [facility]   12/27/2021  3:45 PM [place] Back, PTA [facility]   12/29/2021  3:15 PM TANNER Latif [facility]   12/29/2021  3:45 PM Rob Marx, PT [facility]

## 2021-12-10 ENCOUNTER — HOSPITAL ENCOUNTER (OUTPATIENT)
Dept: PHYSICAL THERAPY | Age: 66
Discharge: HOME | End: 2021-12-10
Attending: ORTHOPAEDIC SURGERY
Payer: COMMERCIAL

## 2021-12-10 PROCEDURE — 97110 THERAPEUTIC EXERCISES: CPT

## 2021-12-10 PROCEDURE — 97140 MANUAL THERAPY 1/> REGIONS: CPT

## 2021-12-10 NOTE — PROGRESS NOTES
PT DAILY TREATMENT NOTE     Patient Name: Juan José Boss  Date:12/10/2021  : 1955  [x]  Patient  Verified  Payor: Josy Seo / Plan: [employer] / Product Type: HMO /    In time:1203pm  Out time:1255pm  Total Treatment Time (min): 52  Visit #: 5 of 12    Medicare/BCBS Only   Total Timed Codes (min):  52 1:1 Treatment Time:  42       Treatment Area: Right rotator cuff tear [M75.101]  Status post rotator cuff surgery [Z98.890]    SUBJECTIVE  Pain Level (0-10 scale): 0  Any medication changes, allergies to medications, adverse drug reactions, diagnosis change, or new procedure performed?: [x] No    [] Yes (see summary sheet for update)  Subjective functional status/changes:   [] No changes reported  Reports no adverse reactions to progressions in therEx AAROM. OBJECTIVE    Modality rationale: decrease edema, decrease inflammation and decrease pain to improve the patients ability to manage self care. Min Type Additional Details   10 [x]  Vasopneumatic Device    [x]  Right     []  Left  Pre-treatment girth:  Post-treatment girth:  Measured at (location):  Pressure:       [x] lo [] med [] hi   Temperature: [x] lo [] med [] hi   [] Skin assessment post-treatment:  []intact []redness- no adverse reaction    []redness  adverse reaction:     42 min Therapeutic Exercise:  [] See flow sheet :   Rationale: increase ROM, increase strength, improve coordination and increase proprioception to improve the patients ability to perform ADLs and self care tasks with improved independence per protocol restrictions. 10 min Manual Therapy:  GR III distraction oscillations in available flexion ROM. GR III inferior oscillations in 90 deg abd and in full available abd with neutral Er/IR to avoid biceps irritation. The manual therapy interventions were performed at a separate and distinct time from the therapeutic activities interventions.   Rationale: decrease pain, increase ROM and increase tissue extensibility to improve capacity for overhead reaching when protocol/healing allows. With   [] TE   [] TA   [] neuro   [] other: Patient Education: [x] Review HEP    [] Progressed/Changed HEP based on:   [] positioning   [] body mechanics   [] transfers   [] heat/ice application    [] other:      Other Objective/Functional Measures: added dowel abd     Pain Level (0-10 scale) post treatment: 0    ASSESSMENT/Changes in Function: Pt performs all prescribed AAROM activities pain free. Ensured neutral [address] rotation with all [address] mobilizations today to reduce discomfort to the biceps. Slight superior capsular tightness perceived with abduction range, so most time with manual spent on inferior glide stretches. Patient will continue to benefit from skilled PT services to modify and progress therapeutic interventions, address functional mobility deficits, address ROM deficits, address strength deficits, analyze and address soft tissue restrictions, analyze and cue movement patterns, analyze and modify body mechanics/ergonomics, assess and modify postural abnormalities and instruct in home and community integration to attain remaining goals. []  See Plan of Care  []  See progress note/recertification  []  See Discharge Summary         Progress towards goals / Updated goals:  1. Pt will attain > 165 deg flexion AROM to progress towards improved capacity of ADLs as protocol progresses.             PN: 168 deg PROM              Current: 165 deg AAROM (12/6/2021)  2. Pt will attain or retain > 140 deg scaption AROM to progress towards improved capacity of ADLs as protocol progresses.             PN: 155 deg scaption PROM, 140 deg ABD PROM   3. Pt will attain or retain 70 deg ER AROM to progress towards improved capacity of ADLs as protocol progresses.             PN: 70 deg after 10 minutes of STM and PROM (50 deg initial after getting out of sling)              ASKZKLZ: AAROM 75 deg without manual therapy. (12/3/2021)  4. Pt will report ability to don and doff clothing and sling with independence to improve ease of self care.              PN: Nas               XVWENUB: GOAL COMPLETE/DISCONTINUED -- pt d/c sling at home, only dons for community use with  Nas. (12/3/2021)  5. Pt will improve her FOTO to 58, demonstrating improved functional ADL capacity.               PN: 42    PLAN  []  Upgrade activities as tolerated     [x]  Continue plan of care   []  Update interventions per flow sheet       []  Discharge due to:_  []  Other:_      Sada Niño, PT 12/10/2021  12:05 PM    Future Appointments   Date Time Provider Josiah Hernandez   12/13/2021  2:30 PM Mallika Cordova, PTA [facility]   12/15/2021  3:45 PM Payton Medellin, PT [facility]   12/17/2021  3:45 PM Emmett Nirali, PTA [facility]   12/20/2021  3:45 PM Emmett Nirali, PTA [facility]   12/22/2021  3:45 PM Emmett Nirali, PTA [facility]   12/27/2021  3:45 PM Emmett Nirali, PTA [facility]   12/29/2021  3:15 PM TANNER Hernandez [facility]   12/29/2021  3:45 PM Payton Medellin, PT [facility]

## 2021-12-13 ENCOUNTER — HOSPITAL ENCOUNTER (OUTPATIENT)
Dept: PHYSICAL THERAPY | Age: 66
Discharge: HOME | End: 2021-12-13
Attending: ORTHOPAEDIC SURGERY
Payer: COMMERCIAL

## 2021-12-13 PROCEDURE — 97140 MANUAL THERAPY 1/> REGIONS: CPT

## 2021-12-13 PROCEDURE — 97110 THERAPEUTIC EXERCISES: CPT

## 2021-12-13 NOTE — PROGRESS NOTES
PT DAILY TREATMENT NOTE     Patient Name: Daquan Montoya  CYS  : 1955  [x]  Patient  Verified  Payor: Chanda Grace / Plan: Drew Mayo / Product Type: HMO /    In time:2:30  Out time:3:18  Total Treatment Time (min): 48  Visit #: 6 of 12    Medicare/BCBS Only   Total Timed Codes (min):  38 1:1 Treatment Time:  38       Treatment Area: Right rotator cuff tear [M75.101]  Status post rotator cuff surgery [Z98.890]    SUBJECTIVE  Pain Level (0-10 scale): 0  Any medication changes, allergies to medications, adverse drug reactions, diagnosis change, or new procedure performed?: [x] No    [] Yes (see summary sheet for update)  Subjective functional status/changes:   [] No changes reported  Pt repots feeling good today, no new complaints    OBJECTIVE    Modality rationale: decrease pain to improve the patients ability to perform daily tasks   Min Type Additional Details    [] Estim:  []Unatt       []IFC  []Premod                        []Other:  []w/ice   []w/heat  Position:  Location:    [] Estim: []Att    []TENS instruct  []NMES                    []Other:  []w/US   []w/ice   []w/heat  Position:  Location:    []  Traction: [] Cervical       []Lumbar                       [] Prone          []Supine                       []Intermittent   []Continuous Lbs:  [] before manual  [] after manual    []  Ultrasound: []Continuous   [] Pulsed                           []1MHz   []3MHz W/cm2:  Location:    []  Iontophoresis with dexamethasone         Location: [] Take home patch   [] In clinic    []  Ice     []  heat  []  Ice massage  []  Laser   []  Anodyne Position:  Location:    []  Laser with stim  []  Other:  Position:  Location:   10 [x]  Vasopneumatic Device    [x]  Right     []  Left  Pre-treatment girth:  Post-treatment girth:  Measured at (location):  Pressure:       [x] lo [] med [] hi   Temperature: [x] lo [] med [] hi   [] Skin assessment post-treatment:  []intact []redness- no adverse reaction    []redness  adverse reaction:     28 min Therapeutic Exercise:  [x] See flow sheet :   Rationale: increase ROM and increase strength to improve the patients ability to perform ADLs    10 min Manual Therapy:  GH distraction, grade 3 inf glides, STM to left UT and bicep   The manual therapy interventions were performed at a separate and distinct time from the therapeutic activities interventions. Rationale: decrease pain, increase ROM and increase tissue extensibility to improve functional mobility           With   [] TE   [] TA   [] neuro   [] other: Patient Education: [x] Review HEP    [] Progressed/Changed HEP based on:   [] positioning   [] body mechanics   [] transfers   [] heat/ice application    [] other:      Other Objective/Functional Measures:      Pain Level (0-10 scale) post treatment: 0    ASSESSMENT/Changes in Function: Pt performs therex void of pain and demo's good tolerance to manual therapy. Pt is making good progress per protocol and will be 6 weeks post op next visit. Patient will continue to benefit from skilled PT services to modify and progress therapeutic interventions, address functional mobility deficits, address ROM deficits, address strength deficits, analyze and address soft tissue restrictions, analyze and cue movement patterns, analyze and modify body mechanics/ergonomics and assess and modify postural abnormalities to attain remaining goals. []  See Plan of Care  []  See progress note/recertification  []  See Discharge Summary         Progress towards goals / Updated goals:  1. Pt will attain > 165 deg flexion AROM to progress towards improved capacity of ADLs as protocol progresses.             PN: 168 deg PROM              Current: 165 deg AAROM (12/6/2021)  2. Pt will attain or retain > 140 deg scaption AROM to progress towards improved capacity of ADLs as protocol progresses.             PN: 155 deg scaption PROM, 140 deg ABD PROM   3.  Pt will attain or retain 70 deg ER AROM to progress towards improved capacity of ADLs as protocol progresses.             PN: 70 deg after 10 minutes of STM and PROM (50 deg initial after getting out of sling)              IJBQBZK: AAROM 75 deg without manual therapy. (12/3/2021)  4. Pt will report ability to don and doff clothing and sling with independence to improve ease of self care.              PN: aNs               EAMASST: GOAL COMPLETE/DISCONTINUED -- pt d/c sling at home, only dons for community use with  Nas. (12/3/2021)  5. Pt will improve her FOTO to 58, demonstrating improved functional ADL capacity.               PN: 42    PLAN  []  Upgrade activities as tolerated     [x]  Continue plan of care  []  Update interventions per flow sheet       []  Discharge due to:_  []  Other:_      Layla Narayan, PTA 12/13/2021  2:28 PM    Future Appointments   Date Time Provider Josiah Mary   12/13/2021  2:30 PM Debby Ferrara, PTA [facility]   12/15/2021  3:45 PM Emilee Salas, PT [facility]   12/17/2021  3:45 PM Steffany Dashoang, PTA [facility]   12/20/2021  3:45 PM Steffany Dasen, PTA [facility]   12/22/2021  3:45 PM Steffany Dashoang, PTA [facility]   12/27/2021  3:45 PM Steffany Dasen, PTA [facility]   12/29/2021  3:15 PM TANNER Bunch [facility]   12/29/2021  3:45 PM Emilee Salas, PT [facility]

## 2021-12-15 ENCOUNTER — HOSPITAL ENCOUNTER (OUTPATIENT)
Dept: PHYSICAL THERAPY | Age: 66
Discharge: HOME | End: 2021-12-15
Attending: ORTHOPAEDIC SURGERY
Payer: COMMERCIAL

## 2021-12-15 PROCEDURE — 97140 MANUAL THERAPY 1/> REGIONS: CPT

## 2021-12-15 PROCEDURE — 97110 THERAPEUTIC EXERCISES: CPT

## 2021-12-15 NOTE — PROGRESS NOTES
PT DAILY TREATMENT NOTE     Patient Name: Janet Montiel  Date:12/15/2021  : 1955  [x]  Patient  Verified  Payor: Marcio Hurtado / Plan: Liss Villatoro / Product Type: HMO /    In time:345pm  Out time:438pm  Total Treatment Time (min): 48  Visit #: 7 of 12    Medicare/BCBS Only   Total Timed Codes (min):  53 1:1 Treatment Time:  40       Treatment Area: Right rotator cuff tear [M75.101]  Status post rotator cuff surgery [Z98.890]    SUBJECTIVE  Pain Level (0-10 scale): 0  Any medication changes, allergies to medications, adverse drug reactions, diagnosis change, or new procedure performed?: [x] No    [] Yes (see summary sheet for update)  Subjective functional status/changes:   [] No changes reported  Continues to report no pain and not using pain meds unless rainy day. OBJECTIVE    Modality rationale: decrease edema, decrease inflammation and decrease pain to improve the patients ability to manage self care. Min Type Additional Details   10 [x]  Vasopneumatic Device    [x]  Right     []  Left  Pre-treatment girth:  Post-treatment girth:  Measured at (location):  Pressure:       [x] lo [] med [] hi   Temperature: [x] lo [] med [] hi   [] Skin assessment post-treatment:  []intact []redness- no adverse reaction    []redness  adverse reaction:     30 min Therapeutic Exercise:  [x] See flow sheet :   Rationale: increase ROM, increase strength, improve coordination and increase proprioception to improve the patients ability to perform ADLs and self care tasks with improved independence    10 min Manual Therapy:  GR III inferior oscillations in approx 120 deg, 140 deg and 160 deg ABD, posterior oscillations GR III in [address] loose pack positioning followed by 90/90 IR positioning; in left S/L, brief STM right upper trap and lats. The manual therapy interventions were performed at a separate and distinct time from the therapeutic activities interventions.   Rationale: decrease pain, increase ROM and increase tissue extensibility to improve capacity for overhead reaching           With   [] TE   [] TA   [] neuro   [] other: Patient Education: [x] Review HEP    [] Progressed/Changed HEP based on:   [] positioning   [] body mechanics   [] transfers   [] heat/ice application    [] other:      Other Objective/Functional Measures: functional IR to L4, functional ER full   Pain Level (0-10 scale) post treatment: 0    ASSESSMENT/Changes in Function: Pt is making excellent progress with her healing and function. Primary goal at this time is to protect the integrity of the healing tissue and introduce pain-free AROM exercises. Will introduce wall slides at next visit. Discontinued sling use after this visit per MD office instructions at her last f/u. Advised pt that she may do gentle AROM activities now but no lifting greater than the weight of a water bottle. Pt opts to keep herself at 3x/week due to reservations regarding potential loss of progress. Will schedule beginning 2x/week in January. Patient will continue to benefit from skilled PT services to modify and progress therapeutic interventions, address functional mobility deficits, address ROM deficits, address strength deficits, analyze and address soft tissue restrictions, analyze and cue movement patterns, analyze and modify body mechanics/ergonomics and assess and modify postural abnormalities to attain remaining goals. []  See Plan of Care  []  See progress note/recertification  []  See Discharge Summary         Progress towards goals / Updated goals:  1. Pt will attain > 165 deg flexion AROM to progress towards improved capacity of ADLs as protocol progresses.             PN: 168 deg PROM   Current: met, 170 AROM (12/15/2021)  2. Pt will attain or retain > 140 deg scaption AROM to progress towards improved capacity of ADLs as protocol progresses.               PN: 155 deg scaption PROM, 140 deg ABD PROM    Current: met, AROM abd 110 with tightness/weakness prior to manual, 170 deg following manual therapy (12/15/2021)  3. Pt will attain or retain 70 deg ER AROM to progress towards improved capacity of ADLs as protocol progresses.             PN: 70 deg after 10 minutes of STM and PROM (50 deg initial after getting out of sling)               BHNJBIU: AAROM 75 deg without manual therapy. (12/3/2021)   Current: met, 85 deg in neutral plane and horizontal abd. (12/15/2021)  4. Pt will report ability to don and doff clothing and sling with independence to improve ease of self care.              PN: Myra Montoya              GUSPTJ: met, sling discontinued (12/15/2021)  5. Pt will improve her FOTO to 58, demonstrating improved functional ADL capacity.               PN: 42     PLAN  []  Upgrade activities as tolerated     [x]  Continue plan of care  []  Update interventions per flow sheet       []  Discharge due to:_  []  Other:_      Falcon Breath, PT 12/15/2021  3:54 PM    Future Appointments   Date Time Provider Josiah Hernandez   12/17/2021  3:45 PM Baljinder San PTA [facility]   12/20/2021  3:45 PM Baljinder San, BOONE [facility]   12/22/2021  3:45 PM Baljinder San PTA [facility]   12/27/2021  3:45 PM Baljinder San PTA [facility]   12/29/2021  3:15 PM TANNER Griffin [facility]   12/29/2021  3:45 PM Rose Leblanc PT [facility]

## 2021-12-17 ENCOUNTER — HOSPITAL ENCOUNTER (OUTPATIENT)
Dept: PHYSICAL THERAPY | Age: 66
Discharge: HOME | End: 2021-12-17
Attending: ORTHOPAEDIC SURGERY
Payer: COMMERCIAL

## 2021-12-17 PROCEDURE — 97140 MANUAL THERAPY 1/> REGIONS: CPT

## 2021-12-17 PROCEDURE — 97110 THERAPEUTIC EXERCISES: CPT

## 2021-12-17 NOTE — PROGRESS NOTES
PT DAILY TREATMENT NOTE     Patient Name: Mckenzie Joyce  Date:2021  : 1955  [x]  Patient  Verified  Payor: Willa Clark / Plan: Giselle Pratt / Product Type: HMO /    In time:3:45  Out time:4:29  Total Treatment Time (min): 44  Visit #: 8 of 12    Medicare/BCBS Only   Total Timed Codes (min):  34 1:1 Treatment Time:  28       Treatment Area: Right rotator cuff tear [M75.101]  Status post rotator cuff surgery [Z98.890]    SUBJECTIVE  Pain Level (0-10 scale): 0  Any medication changes, allergies to medications, adverse drug reactions, diagnosis change, or new procedure performed?: [x] No    [] Yes (see summary sheet for update)  Subjective functional status/changes:   [] No changes reported  Pt reports no change since last visit. OBJECTIVE    Modality rationale: decrease inflammation and decrease pain to improve the patients ability to perform functional task with ease.    Min Type Additional Details    [] Estim:  []Unatt       []IFC  []Premod                        []Other:  []w/ice   []w/heat  Position:  Location:    [] Estim: []Att    []TENS instruct  []NMES                    []Other:  []w/US   []w/ice   []w/heat  Position:  Location:    []  Traction: [] Cervical       []Lumbar                       [] Prone          []Supine                       []Intermittent   []Continuous Lbs:  [] before manual  [] after manual    []  Ultrasound: []Continuous   [] Pulsed                           []1MHz   []3MHz W/cm2:  Location:    []  Iontophoresis with dexamethasone         Location: [] Take home patch   [] In clinic    []  Ice     []  heat  []  Ice massage  []  Laser   []  Anodyne Position:  Location:    []  Laser with stim  []  Other:  Position:  Location:   10 [x]  Vasopneumatic Device    [x]  Right     []  Left  Pre-treatment girth:  Post-treatment girth:  Measured at (location):  Pressure:       [x] lo [] med [] hi   Temperature: [x] lo [] med [] hi   [] Skin assessment post-treatment: [x]intact []redness- no adverse reaction    []redness  adverse reaction:       24 min Therapeutic Exercise:  [x] See flow sheet :   Rationale: increase ROM and increase strength to improve the patients ability to perform functional task with ease. 10 min Manual Therapy:   GR III inferior oscillations in approx 120 deg, 140 deg and 160 deg ABD, posterior oscillations GR III in [facility] loose pack positioning followed by 90/90 IR positioning; in left S/L, brief STM right upper trap and lats. The manual therapy interventions were performed at a separate and distinct time from the therapeutic activities interventions. Rationale: decrease pain, increase ROM and increase tissue extensibility to improve functional mobility with overhead reaching ADL's per protocol. With   [] TE   [] TA   [] neuro   [] other: Patient Education: [x] Review HEP    [] Progressed/Changed HEP based on:   [] positioning   [] body mechanics   [] transfers   [] heat/ice application    [] other:      Other Objective/Functional Measures:   Wall slides (flex/scap)- 10x ea  Doorway ER stretch- 30\"x2  Wand exercises progressed to seated     Pain Level (0-10 scale) post treatment: 0    ASSESSMENT/Changes in Function:   Wall slides (flex/scap) and Doorway ER stretch initiated this visit to continue to improve right shoulder ROM and mobility with pt performing 12 reps for wall slides with good form, no notable compensations and no increasing right shoulder pain. Pt notes a good stretch with doorway ER stretch and also notes no increasing right shoulder pain. Plan to initiate S/L abd and ER NV.     Patient will continue to benefit from skilled PT services to modify and progress therapeutic interventions, address functional mobility deficits, address ROM deficits, address strength deficits, analyze and address soft tissue restrictions, analyze and cue movement patterns, analyze and modify body mechanics/ergonomics and assess and modify postural abnormalities to attain remaining goals. [x]  See Plan of Care  []  See progress note/recertification  []  See Discharge Summary         Progress towards goals / Updated goals:  1. Pt will attain > 165 deg flexion AROM to progress towards improved capacity of ADLs as protocol progresses.             PN: 168 deg PROM              Current: met, 170 AROM (12/15/2021)  2. Pt will attain or retain > 140 deg scaption AROM to progress towards improved capacity of ADLs as protocol progresses.             PN: 155 deg scaption PROM, 140 deg ABD PROM               Current: met, AROM abd 110 with tightness/weakness prior to manual, 170 deg following manual therapy (12/15/2021)  3. Pt will attain or retain 70 deg ER AROM to progress towards improved capacity of ADLs as protocol progresses.             PN: 70 deg after 10 minutes of STM and PROM (50 deg initial after getting out of sling)               WTGMZXN: AAROM 75 deg without manual therapy. (12/3/2021)              Current: met, 85 deg in neutral plane and horizontal abd. (12/15/2021)  4. Pt will report ability to don and doff clothing and sling with independence to improve ease of self care.              PN: Tanisha Baird              DFZJMXE: met, sling discontinued (12/15/2021)  5. Pt will improve her FOTO to 58, demonstrating improved functional ADL capacity.               PN: 42     PLAN  []  Upgrade activities as tolerated     [x]  Continue plan of care  []  Update interventions per flow sheet       []  Discharge due to:_  []  Other:_      Ashley Cline, PTA 12/17/2021  3:45 PM    Future Appointments   Date Time Provider Josiah Hernandez   12/20/2021  3:45 PM María Hastings PTA [facility]   12/22/2021  3:45 PM María Hastings PTA [facility]   12/27/2021  3:45 PM María Hastings PTA [facility]   12/29/2021  3:15 PM Nonda Scot, PA [facility]   12/29/2021  3:45 PM Chloe Cos, PT [facility]   1/3/2022  5:15 PM María Hastings, PTA [facility] 1/6/2022  3:15 PM Nadeem Jose, PT [facility]   1/11/2022  4:30 PM Nadeem Jose, PT [facility]   1/14/2022  5:15 PM Nadeem Jose, PT [facility]   1/18/2022  4:30 PM Nadeem Jose, PT [facility]   1/21/2022  5:15 PM Galina Wheeler, PTA [facility]   1/25/2022  4:30 PM Nadeem Jose, PT [facility]   1/28/2022  5:15 PM Nadeem Jose, PT [facility]   1/31/2022  4:30 PM Galina Wheeler, PTA [facility]

## 2021-12-20 ENCOUNTER — HOSPITAL ENCOUNTER (OUTPATIENT)
Dept: PHYSICAL THERAPY | Age: 66
Discharge: HOME | End: 2021-12-20
Attending: ORTHOPAEDIC SURGERY
Payer: COMMERCIAL

## 2021-12-20 PROCEDURE — 97140 MANUAL THERAPY 1/> REGIONS: CPT

## 2021-12-20 PROCEDURE — 97110 THERAPEUTIC EXERCISES: CPT

## 2021-12-20 NOTE — PROGRESS NOTES
PT DAILY TREATMENT NOTE     Patient Name: Claribel Ochoa  Date:2021  : 1955  [x]  Patient  Verified  Payor: Rachael Overall / Plan: Mccrary Setter / Product Type: HMO /    In time:3:45  Out time:4:34  Total Treatment Time (min): 49  Visit #: 9 of 12    Medicare/BCBS Only   Total Timed Codes (min):  39 1:1 Treatment Time:  28       Treatment Area: Right rotator cuff tear [M75.101]  Status post rotator cuff surgery [Z98.890]    SUBJECTIVE  Pain Level (0-10 scale): 0  Any medication changes, allergies to medications, adverse drug reactions, diagnosis change, or new procedure performed?: [x] No    [] Yes (see summary sheet for update)  Subjective functional status/changes:   [] No changes reported  Pt reports she had pain all over on Saturday, in her back, her shoulder and a head ache but she feels it was due to a change in the weather. Pt states by  she felt fine again. OBJECTIVE    Modality rationale: decrease inflammation and decrease pain to improve the patients ability to perform daily task with ease.    Min Type Additional Details    [] Estim:  []Unatt       []IFC  []Premod                        []Other:  []w/ice   []w/heat  Position:  Location:    [] Estim: []Att    []TENS instruct  []NMES                    []Other:  []w/US   []w/ice   []w/heat  Position:  Location:    []  Traction: [] Cervical       []Lumbar                       [] Prone          []Supine                       []Intermittent   []Continuous Lbs:  [] before manual  [] after manual    []  Ultrasound: []Continuous   [] Pulsed                           []1MHz   []3MHz W/cm2:  Location:    []  Iontophoresis with dexamethasone         Location: [] Take home patch   [] In clinic    []  Ice     []  heat  []  Ice massage  []  Laser   []  Anodyne Position:  Location:    []  Laser with stim  []  Other:  Position:  Location:   10 [x]  Vasopneumatic Device    [x]  Right     []  Left  Pre-treatment girth:  Post-treatment girth: Measured at (location):  Pressure:       [x] lo [] med [] hi   Temperature: [x] lo [] med [] hi   [] Skin assessment post-treatment:  []intact []redness- no adverse reaction    []redness  adverse reaction:       29 min Therapeutic Exercise:  [x] See flow sheet :   Rationale: increase ROM, increase strength and improve coordination to improve the patients ability to perform functional task with ease. 10 min Manual Therapy:  in left S/L, brief STM right upper trap and lats. GR III inferior oscillations in approx 120 deg, 140 deg and 160 deg ABD, posterior oscillations GR III in [facility] loose pack positioning followed by 90/90 IR positioning; The manual therapy interventions were performed at a separate and distinct time from the therapeutic activities interventions. Rationale: decrease pain, increase ROM, increase tissue extensibility and decrease trigger points to improve functional mobility with overhead reaching ADL's. With   [] TE   [] TA   [] neuro   [] other: Patient Education: [x] Review HEP    [] Progressed/Changed HEP based on:   [] positioning   [] body mechanics   [] transfers   [] heat/ice application    [] other:      Other Objective/Functional Measures:   Wall slides (flex/abd) with lift- 12x ea     Pain Level (0-10 scale) post treatment: 0    ASSESSMENT/Changes in Function:   Pt continues to progress well with treatment to the right shoulder noting no pain with therex, performing exercises as prescribed with no notable compensations and displaying great PROM and AAROM. Wall slides with lift initiated this visit to continue to improve right shoulder mobility with pt performing with no reported increasing pain. S/L abd and ER initiated this visit as well with pt performing as prescribed. Plan to initiate AROM full cans x 3 NV to continue to strengthen the right shoulder and improve mobility. No pain reported post treatment.      Patient will continue to benefit from skilled PT services to modify and progress therapeutic interventions, address functional mobility deficits, address ROM deficits, address strength deficits, analyze and address soft tissue restrictions, analyze and cue movement patterns, analyze and modify body mechanics/ergonomics and assess and modify postural abnormalities to attain remaining goals. [x]  See Plan of Care  []  See progress note/recertification  []  See Discharge Summary         Progress towards goals / Updated goals:  1. Pt will attain > 165 deg flexion AROM to progress towards improved capacity of ADLs as protocol progresses.             PN: 168 deg PROM              YAXIPHG: FELICITAS, 273 AROM (12/15/2021)  2. Pt will attain or retain > 140 deg scaption AROM to progress towards improved capacity of ADLs as protocol progresses.             PN: 155 deg scaption PROM, 140 deg ABD PROM               Current: met, AROM abd 110 with tightness/weakness prior to manual, 170 deg following manual therapy (12/15/2021)  3. Pt will attain or retain 70 deg ER AROM to progress towards improved capacity of ADLs as protocol progresses.             PN: 70 deg after 10 minutes of STM and PROM (50 deg initial after getting out of sling)               CJNNNVX: AAROM 75 deg without manual therapy. (12/3/2021)              Current: met, 85 deg in neutral plane and horizontal abd.  (12/15/2021)  4. Pt will report ability to don and doff clothing and sling with independence to improve ease of self care.              PN: Jocelyn Dasilva              Current: met, sling discontinued (12/15/2021)  5. Pt will improve her FOTO to 58, demonstrating improved functional ADL capacity.               PN: 42   Current: Near met 12/20/21 FOTO score 57%     PLAN  []  Upgrade activities as tolerated     [x]  Continue plan of care  []  Update interventions per flow sheet       []  Discharge due to:_  []  Other:_      Mercy Ek, PTA 12/20/2021  3:45 PM    Future Appointments   Date Time Provider Department Center   12/22/2021  3:45 PM Gege Drought, PTA [facility]   12/27/2021  3:45 PM Gege Drought, PTA [facility]   12/29/2021  3:15 PM Berto Plants, PA [facility]   12/29/2021  3:45 PM Jayde Martinis, PT [facility]   1/3/2022  5:15 PM Gege Drought, PTA [facility]   1/6/2022  3:15 PM Jayde Martinis, PT [facility]   1/11/2022  4:30 PM Jayde Martinis, PT [facility]   1/14/2022  5:15 PM Jayde Martinis, PT [facility]   1/18/2022  4:30 PM Jayde Martinis, PT [facility]   1/21/2022  5:15 PM Gege Drought, PTA [facility]   1/25/2022  4:30 PM Jayde Itzis, PT [facility]   1/28/2022  5:15 PM Jayde Martinis, PT [facility]   1/31/2022  4:30 PM Gege Drought, PTA [facility]

## 2021-12-22 ENCOUNTER — HOSPITAL ENCOUNTER (OUTPATIENT)
Dept: PHYSICAL THERAPY | Age: 66
Discharge: HOME | End: 2021-12-22
Attending: ORTHOPAEDIC SURGERY
Payer: COMMERCIAL

## 2021-12-22 PROCEDURE — 97140 MANUAL THERAPY 1/> REGIONS: CPT

## 2021-12-22 PROCEDURE — 97110 THERAPEUTIC EXERCISES: CPT

## 2021-12-22 NOTE — PROGRESS NOTES
PT DAILY TREATMENT NOTE     Patient Name: Ann-Marie Lomax  Date:2021  : 1955  [x]  Patient  Verified  Payor: Leigh Ann Found / Plan: Lydia Cooler / Product Type: HMO /    In time:3:45  Out time:4:35  Total Treatment Time (min): 50  Visit #: 10 of 12    Medicare/BCBS Only   Total Timed Codes (min):  40 1:1 Treatment Time:  36       Treatment Area: Right rotator cuff tear [M75.101]  Status post rotator cuff surgery [Z98.890]    SUBJECTIVE  Pain Level (0-10 scale): 0  Any medication changes, allergies to medications, adverse drug reactions, diagnosis change, or new procedure performed?: [x] No    [] Yes (see summary sheet for update)  Subjective functional status/changes:   [] No changes reported  Pt reports her shoulder is getting better everyday. OBJECTIVE    Modality rationale: decrease inflammation and decrease pain to improve the patients ability to perform ADL's with ease.    Min Type Additional Details    [] Estim:  []Unatt       []IFC  []Premod                        []Other:  []w/ice   []w/heat  Position:  Location:    [] Estim: []Att    []TENS instruct  []NMES                    []Other:  []w/US   []w/ice   []w/heat  Position:  Location:    []  Traction: [] Cervical       []Lumbar                       [] Prone          []Supine                       []Intermittent   []Continuous Lbs:  [] before manual  [] after manual    []  Ultrasound: []Continuous   [] Pulsed                           []1MHz   []3MHz W/cm2:  Location:    []  Iontophoresis with dexamethasone         Location: [] Take home patch   [] In clinic    []  Ice     []  heat  []  Ice massage  []  Laser   []  Anodyne Position:  Location:    []  Laser with stim  []  Other:  Position:  Location:   10 [x]  Vasopneumatic Device    [x]  Right     []  Left  Pre-treatment girth:  Post-treatment girth:  Measured at (location):  Pressure:       [x] lo [] med [] hi   Temperature: [x] lo [] med [] hi   [x] Skin assessment post-treatment: [x]intact []redness- no adverse reaction    []redness  adverse reaction:       32 min Therapeutic Exercise:  [x] See flow sheet :   Rationale: increase ROM and increase strength to improve the patients ability to perform ADL's with ease. 8 min Manual Therapy:  in left S/L, brief STM right upper trap and lats. GR III inferior oscillations in approx 120 deg, 140 deg and 160 deg ABD, posterior oscillations GR III in [address] loose pack positioning followed by 90/90 IR positioning; The manual therapy interventions were performed at a separate and distinct time from the therapeutic activities interventions. Rationale: decrease pain, increase ROM and increase tissue extensibility to improve functional mobility with overhead reaching ADL's. With   [] TE   [] TA   [] neuro   [] other: Patient Education: [x] Review HEP    [] Progressed/Changed HEP based on:   [] positioning   [] body mechanics   [] transfers   [] heat/ice application    [] other:      Other Objective/Functional Measures:   Full cans x 3- 10x ea       Pain Level (0-10 scale) post treatment: 0    ASSESSMENT/Changes in Function:  Pt continues to show great progression with treatment to the right shoulder noting she is able to dress herself with no difficulty, she is able to get in and out of the car with no issues and is able to perform functional IR of the right UE with ease reaching about T12-L1 with no pain. Progressed therex to AROM of the right shoulder today with the performance of full cans x 3 with pt noting no pain and displaying WNL ranges of motion. UBE initiated this visit as well to continue to progress mobility of the right shoulder. Pt continues to lack strength in the right shoulder and would benefit from continued treatment to improve strength and stability to aid with overhead reaching ADL's.     Patient will continue to benefit from skilled PT services to modify and progress therapeutic interventions, address functional mobility deficits, address ROM deficits, address strength deficits, analyze and address soft tissue restrictions, analyze and cue movement patterns, analyze and modify body mechanics/ergonomics and assess and modify postural abnormalities to attain remaining goals. [x]  See Plan of Care  []  See progress note/recertification  []  See Discharge Summary         Progress towards goals / Updated goals:  1. Pt will attain > 165 deg flexion AROM to progress towards improved capacity of ADLs as protocol progresses.             PN: 168 deg PROM              KWXWBCI: [place], 512 AROM (12/15/2021)  2. Pt will attain or retain > 140 deg scaption AROM to progress towards improved capacity of ADLs as protocol progresses.             PN: 155 deg scaption PROM, 140 deg ABD PROM               Current: met, AROM abd 110 with tightness/weakness prior to manual, 170 deg following manual therapy (12/15/2021)  3. Pt will attain or retain 70 deg ER AROM to progress towards improved capacity of ADLs as protocol progresses.             PN: 70 deg after 10 minutes of STM and PROM (50 deg initial after getting out of sling)               XCFATAA: AAROM 75 deg without manual therapy. (12/3/2021)              Current: met, 85 deg in neutral plane and horizontal abd.  (12/15/2021)  4. Pt will report ability to don and doff clothing and sling with independence to improve ease of self care.              PN: Nellie Peralta              Current: met, sling discontinued (12/15/2021)  5. Pt will improve her FOTO to 58, demonstrating improved functional ADL capacity.               PN: 42              Current: Near met 12/20/21 FOTO score 57%    PLAN  []  Upgrade activities as tolerated     [x]  Continue plan of care  []  Update interventions per flow sheet       []  Discharge due to:_  []  Other:_      Abigail Rogers PTA 12/22/2021  3:54 PM    Future Appointments   Date Time Provider Josiah Hernandez   12/27/2021  3:45 PM Tristin Taylor PTA [facility] 12/29/2021  3:15 PM Brenda Dos Santos, PA [facility]   12/29/2021  3:45 PM Jerelene Paganini, PT [facility]   1/3/2022  5:15 PM Diane Bibber, PTA [facility]   1/6/2022  3:15 PM Jerelene Paganini, PT [facility]   1/11/2022  4:30 PM Jerelene Paganini, PT [facility]   1/14/2022  5:15 PM Jerelene Paganini, PT [facility]   1/18/2022  4:30 PM Jerelene Paganini, PT [facility]   1/21/2022  5:15 PM Diane Bibber, PTA [facility]   1/25/2022  4:30 PM Jerelene Paganini, PT [facility]   1/28/2022  5:15 PM Jerelene Paganini, PT [facility]   1/31/2022  4:30 PM Diane Bibber, PTA [facility]

## 2021-12-23 NOTE — PROGRESS NOTES
[facility]  [address]  [place], [address]  [phone] fax    Physical Therapy Progress Note  Patient name: Abigail Barr Start of Care: 2021   Referral source: Radha Nassar : 1955   Medical/Treatment Diagnosis: Right rotator cuff tear [M75.101]  Status post rotator cuff surgery [Z98.890]  Payor: BLUE CROSS / Plan: Beth Mota / Product Type: HMO /  Onset Date:11/3/2021 DOS                  Prior Hospitalization: see medical history Provider#: [id number]   Medications: Verified on Patient Summary List    Comorbidities: tobacco use, BMI > 30   Prior Level of Function: some diffuse shoulder pain prior to surgery but independence ADLs and self care. Visits from Start of Care: 20    Missed Visits: 0    Progress towards goals / Updated goals:  1. Pt will attain > 165 deg flexion AROM to progress towards improved capacity of ADLs as protocol progresses.             PN: 168 deg PROM              EIBZQTR: CGK, 250 AROM   2. Pt will attain or retain > 140 deg scaption AROM to progress towards improved capacity of ADLs as protocol progresses.             PN: 155 deg scaption PROM, 140 deg ABD PROM               Current: met, AROM abd 110 with tightness/weakness prior to manual, 170 deg following manual therapy  3. Pt will attain or retain 70 deg ER AROM to progress towards improved capacity of ADLs as protocol progresses.             PN: 70 deg after 10 minutes of STM and PROM (50 deg initial after getting out of sling)               TNEYXDM: AAROM 75 deg without manual therapy.              Current: met, 85 deg in neutral plane and horizontal abd.   4. Pt will report ability to don and doff clothing and sling with independence to improve ease of self care.              PN: Nas               Current: met, sling discontinued   5.  Pt will improve her FOTO to 62, demonstrating improved functional ADL capacity.             PN: 42              FLIHNRL: Near met  FOTO score 57%      Updated Goals: to be achieved in 4 weeks:  1. Pt will improve her FOTO to 58, demonstrating improved functional ADL capacity.                           PN: Near met, FOTO score 57%  2. Pt will demonstrate 4+/5 right shoulder flex/ABD strength void of pain increase to improved ease of daily task. PN: 3/5 to 3+/5  3. Pt will report no difficulty placing a can of soup (1 lb) on a shelf at shoulder height to aid with ease of everyday activities. PN: pt reports difficulty      ASSESSMENT/RECOMMENDATIONS:  Pt continues to show great progression with treatment of the right shoulder reporting she is able to dress herself with no difficulty, she is able to get in and out of the car with no issues, and is able to perform functional IR of the right UE with ease reaching about T12-L1 with no pain. Progressed therex to AROM of the right shoulder today with the performance of full cans in flexion, scaption, and abduction without pain and displaying WNL ranges of motion. Pt displays functional weakness of the right shoulder and would benefit from continued treatment to improve strength and stability to aid with overhead reaching ADL's.     [x]Continue therapy per initial plan/protocol at a frequency of  2 x per week for 4 weeks  []Continue therapy with the following recommended changes:_____________________      _____________________________________________________________________  []Discontinue therapy progressing towards or have reached established goals  []Discontinue therapy due to lack of appreciable progress towards goals  []Discontinue therapy due to lack of attendance or compliance  []Await Physician's recommendations/decisions regarding therapy  []Other:________________________________________________________________    Thank you for this referral.    Reina Holden, PTA 12/23/2021 7:26 AM  Tanya Meléndez PT, DPT  12/29/2021  10:11 AM  NOTE TO PHYSICIAN:  PLEASE COMPLETE THE ORDERS BELOW AND   FAX TO [facility]: [phone]  If you are unable to process this request in 24 hours please contact our office: [phone]    ? I have read the above report and request that my patient continue as recommended. ? I have read the above report and request that my patient continue therapy with the following changes/special instructions:__________________________________________________________  ? I have read the above report and request that my patient be discharged from therapy.     Physicians signature: ______________________________Date: ______Time:______     Tressa Part,*

## 2021-12-27 ENCOUNTER — HOSPITAL ENCOUNTER (OUTPATIENT)
Dept: PHYSICAL THERAPY | Age: 66
Discharge: HOME | End: 2021-12-27
Attending: ORTHOPAEDIC SURGERY
Payer: COMMERCIAL

## 2021-12-27 PROCEDURE — 97110 THERAPEUTIC EXERCISES: CPT

## 2021-12-27 PROCEDURE — 97140 MANUAL THERAPY 1/> REGIONS: CPT

## 2021-12-27 NOTE — PROGRESS NOTES
PT DAILY TREATMENT NOTE     Patient Name: Romulo Friedman  Date:2021  : 1955  [x]  Patient  Verified  Payor: Marisol Goodwin / Plan: Rebekah Alejo / Product Type: HMO /    In time:3:45  Out time: 4:28  Total Treatment Time (min): 43  Visit #: 1 of 8    Medicare/BCBS Only   Total Timed Codes (min):  33 1:1 Treatment Time:  33       Treatment Area: Right rotator cuff tear [M75.101]  Status post rotator cuff surgery [Z98.890]    SUBJECTIVE  Pain Level (0-10 scale): 0  Any medication changes, allergies to medications, adverse drug reactions, diagnosis change, or new procedure performed?: [x] No    [] Yes (see summary sheet for update)  Subjective functional status/changes:   [x] No changes reported      OBJECTIVE    Modality rationale: decrease inflammation and decrease pain to improve the patients ability to tolerate post workout soreness.    Min Type Additional Details    [] Estim:  []Unatt       []IFC  []Premod                        []Other:  []w/ice   []w/heat  Position:  Location:    [] Estim: []Att    []TENS instruct  []NMES                    []Other:  []w/US   []w/ice   []w/heat  Position:  Location:    []  Traction: [] Cervical       []Lumbar                       [] Prone          []Supine                       []Intermittent   []Continuous Lbs:  [] before manual  [] after manual    []  Ultrasound: []Continuous   [] Pulsed                           []1MHz   []3MHz W/cm2:  Location:    []  Iontophoresis with dexamethasone         Location: [] Take home patch   [] In clinic    []  Ice     []  heat  []  Ice massage  []  Laser   []  Anodyne Position:  Location:    []  Laser with stim  []  Other:  Position:  Location:   10 [x]  Vasopneumatic Device    [x]  Right     []  Left  Pre-treatment girth:  Post-treatment girth:  Measured at (location):  Pressure:       [x] lo [] med [] hi   Temperature: [x] lo [] med [] hi   [] Skin assessment post-treatment:  []intact []redness- no adverse reaction []redness  adverse reaction:       25 min Therapeutic Exercise:  [x] See flow sheet :   Rationale: increase ROM and increase strength to improve the patients ability to perform functional task with ease. 8 min Neuromuscular Re-education:  [x]  See flow sheet :   Rationale: increase strength, improve coordination and increase proprioception  to improve the patients ability to perform ADL's with ease. With   [] TE   [] TA   [] neuro   [] other: Patient Education: [x] Review HEP    [] Progressed/Changed HEP based on:   [] positioning   [] body mechanics   [] transfers   [] heat/ice application    [] other:      Other Objective/Functional Measures:   T-band:rosw     Pain Level (0-10 scale) post treatment: 0    ASSESSMENT/Changes in Function:   Pt continues to display great ROM of the right shoulder but continues to lack strength noting difficulty with lifting a glass cup. T-band rows/ext/IR/ER with org band initiated and 1# hand weight added to S/L exercises this visit to continue to improve scapular/shoulder strength and stability with pt noting a slight challenge with T band ER but no increasing pain with any of progressed therex. Continued treatment to strengthen the right shoulder and aid with ease of overhead reaching ADL's per protocol. Patient will continue to benefit from skilled PT services to modify and progress therapeutic interventions, address functional mobility deficits, address ROM deficits, address strength deficits, analyze and address soft tissue restrictions, analyze and cue movement patterns, analyze and modify body mechanics/ergonomics and assess and modify postural abnormalities to attain remaining goals.      [x]  See Plan of Care  []  See progress note/recertification  []  See Discharge Summary         Progress towards goals / Updated goals:  Updated Goals: to be achieved in 4 weeks:              1. Pt will improve her FOTO to 58, demonstrating improved functional ADL capacity.                           PN: Near met 12/20/21 FOTO score 57%              2. *new goal* Pt will demonstrate 4+/5 right shoulder flex/ABD strength void of pain increase to improved ease of daily task. 3. *new Goal* Pt will report no difficulty placing a can of soup (1 lb) on a shelf at shoulder height to aid with ease of everyday activities.      PLAN  []  Upgrade activities as tolerated     [x]  Continue plan of care  []  Update interventions per flow sheet       []  Discharge due to:_  []  Other:_      Erin Phelps, PTA 12/27/2021  3:42 PM    Future Appointments   Date Time Provider Josiah Hernandez   12/27/2021  3:45 PM Carol Monday, PTA [facility]   12/29/2021  3:15 PM TANNER Carrasco [facility]   12/29/2021  3:45 PM Conor Grief, PT [facility]   1/3/2022  5:15 PM Carola Monday, PTA [facility]   1/6/2022  3:15 PM Conor Grief, PT [facility]   1/11/2022  4:30 PM Conor Grief, PT [facility]   1/14/2022  5:15 PM Conor Grief, PT [facility]   1/18/2022  4:30 PM Conor Grief, PT [facility]   1/21/2022  5:15 PM Carol Monday, PTA [facility]   1/25/2022  4:30 PM Conor Grief, PT [facility]   1/28/2022  5:15 PM Conor Grief, PT [facility]   1/31/2022  4:30 PM Carol Monday, PTA [facility]

## 2021-12-29 ENCOUNTER — APPOINTMENT (OUTPATIENT)
Dept: PHYSICAL THERAPY | Age: 66
End: 2021-12-29
Attending: ORTHOPAEDIC SURGERY
Payer: COMMERCIAL

## 2022-01-03 ENCOUNTER — APPOINTMENT (OUTPATIENT)
Dept: PHYSICAL THERAPY | Age: 67
End: 2022-01-03
Attending: ORTHOPAEDIC SURGERY
Payer: COMMERCIAL

## 2022-01-06 ENCOUNTER — APPOINTMENT (OUTPATIENT)
Dept: PHYSICAL THERAPY | Age: 67
End: 2022-01-06
Attending: ORTHOPAEDIC SURGERY
Payer: COMMERCIAL

## 2022-01-10 ENCOUNTER — TRANSCRIBE ORDER (OUTPATIENT)
Dept: SCHEDULING | Age: 67
End: 2022-01-10

## 2022-01-10 DIAGNOSIS — Z00.00 ROUTINE GENERAL MEDICAL EXAMINATION AT A HEALTH CARE FACILITY: Primary | ICD-10-CM

## 2022-01-10 DIAGNOSIS — Z78.0 MENOPAUSE: ICD-10-CM

## 2022-01-11 ENCOUNTER — OFFICE VISIT (OUTPATIENT)
Dept: ORTHOPEDIC SURGERY | Age: 67
End: 2022-01-11
Payer: COMMERCIAL

## 2022-01-11 ENCOUNTER — APPOINTMENT (OUTPATIENT)
Dept: PHYSICAL THERAPY | Age: 67
End: 2022-01-11
Attending: ORTHOPAEDIC SURGERY
Payer: COMMERCIAL

## 2022-01-11 VITALS — TEMPERATURE: 97.3 F

## 2022-01-11 DIAGNOSIS — M75.121 NONTRAUMATIC COMPLETE TEAR OF RIGHT ROTATOR CUFF: Primary | ICD-10-CM

## 2022-01-11 PROCEDURE — 99024 POSTOP FOLLOW-UP VISIT: CPT | Performed by: PHYSICIAN ASSISTANT

## 2022-01-11 NOTE — PROGRESS NOTES
Linh Jauregui  1955     HISTORY OF PRESENT ILLNESS  Linh Jauregui is a 61 y.o. female who presents today for evaluation s/p Right shoulder arthroscopic rotator cuff repair performed on 11/3/2021. Patient has been going to PT. Describes pain as a 0/10. Has been taking no meds for pain. She notes some swelling but overall has improved. Still has night pain. Patient denies any fever, chills, chest pain, shortness of breath or calf pain. There are no new illness or injuries to report since last seen in the office. PHYSICAL EXAM:   Visit Vitals  Temp 97.3 °F (36.3 °C) (Temporal)      The patient is a well-developed, well-nourished female in no acute distress. The patient is alert and oriented times three. The patient appears to be well groomed. Mood and affect are normal.  ORTHOPEDIC EXAM of right shoulder:  Inspection: swelling not present,  Bruising not present  Incisions well healed  Passive glenohumeral abduction 0-90 degrees, able to reach up overhead  Stability: Stable  Strength: 4/5  2+ distal pulses    IMPRESSION:  S/P Right shoulder arthroscopic rotator cuff repair performed on 11/3/2021. PLAN:   Well postoperatively. We will continue with physical therapy. Recommend at least 1 more session to get instructed on the strengthening exercises. She can then transition to home exercise program whenever she feels ready. Discussed her limitations at this point.   Patient was in the pain as she gradually increases her activities    Return to care as needed    ELENA Dodd [facility]

## 2022-01-14 ENCOUNTER — HOSPITAL ENCOUNTER (OUTPATIENT)
Dept: PHYSICAL THERAPY | Age: 67
Discharge: HOME | End: 2022-01-14
Attending: ORTHOPAEDIC SURGERY
Payer: COMMERCIAL

## 2022-01-14 PROCEDURE — 97110 THERAPEUTIC EXERCISES: CPT

## 2022-01-14 NOTE — PROGRESS NOTES
PT DAILY TREATMENT NOTE     Patient Name: Silas Spurling  Date:2022  : 1955  [x]  Patient  Verified  Payor: Jayant Rodrigues / Plan: Frankey Baylor / Product Type: HMO /    In time:353pm  Out time:428pm  Total Treatment Time (min): 35  Visit #: 2 of 8    Medicare/BCBS Only   Total Timed Codes (min):  35 1:1 Treatment Time:  30       Treatment Area: Right rotator cuff tear [M75.101]  Status post rotator cuff surgery [Z98.890]    SUBJECTIVE  Pain Level (0-10 scale): 0  Any medication changes, allergies to medications, adverse drug reactions, diagnosis change, or new procedure performed?: [x] No    [] Yes (see summary sheet for update)  Subjective functional status/changes:   [] No changes reported  Reports no limitations aside from strength. OBJECTIVE    30 min Therapeutic Exercise:  [x] See flow sheet :   Rationale: increase strength and improve coordination to improve the patients ability to perform lifting, pushing, and pulling ADLs with improved independence. With   [] TE   [] TA   [] neuro   [] other: Patient Education: [x] Review HEP    [] Progressed/Changed HEP based on:   [] positioning   [] body mechanics   [] transfers   [] heat/ice application    [] other:      Other Objective/Functional Measures: see d/c     Pain Level (0-10 scale) post treatment: 0    ASSESSMENT/Changes in Function: At this time, Ms Carlos Roman has met or is progressing towards meeting all functional therapy goals. Her main impairment is strength, which she chooses to manage at home with an HEP. She has been provided with an updated HEP, theraband, and instruction on exercise progression. She has no further questions and has been a pleasure to work with.     []  See Plan of Care  []  See progress note/recertification  [x]  See Discharge Summary         Progress towards goals / Updated goals:              1.  Pt will improve her FOTO to 62, demonstrating improved functional ADL capacity.                           BW: Near met 12/20/21 FOTO score 57%   Current: met, 63 (1/14/2022)              2. Pt will demonstrate 4+/5 right shoulder flex/ABD strength void of pain increase to improved ease of daily task.    PN: NT     Current: not met, 4-/5 to 4/5 MMT (1/14/2022)               3. Pt will report no difficulty placing a can of soup (1 lb) on a shelf at shoulder height to aid with ease of everyday activities    PN: a little difficulty   Current: met, no difficulty (1/14/2022)    PLAN  []  Upgrade activities as tolerated     []  Continue plan of care  []  Update interventions per flow sheet       [x]  Discharge due to:_   []  Other:_      Denise Sanchez, PT 1/14/2022  4:06 PM    Future Appointments   Date Time Provider Josiah Hernandez   1/19/2022 10:45 AM [facility] LIANET RM 3 3D HBVRMAM [facility]   1/19/2022 11:30 AM HBV BONE DEXA RM 1 HBVRBD [facility]

## 2022-01-14 NOTE — PROGRESS NOTES
[facility]  [address]  [place], [address]  [phone] fax    Physical Therapy Discharge Summary  Patient name: Cecil Medina Start of Care: 2021   Referral source: Peter Shrestha : 1955   Medical/Treatment Diagnosis: Right rotator cuff tear [M75.101]  Status post rotator cuff surgery [Z98.890]  Payor: Kishan Wilson / Plan: Ernestina Hardwick / Product Type: HMO /  Onset Date:11/3/2021     Prior Hospitalization: see medical history Provider#: [id number]   Medications: Verified on Patient Summary List    Comorbidities: tobacco use, BMI > 30   Prior Level of Function: some diffuse shoulder pain prior to surgery but independence ADLs and self care. Visits from Start of Care: 22    Missed Visits: 1  Reporting Period : 2022 to 2022      Summary of Care:              9. Pt will improve her FOTO to 58, demonstrating improved functional ADL capacity.                           KV: Near met 21 FOTO score 57%   Current: met, 63 (2022)              2. Pt will demonstrate 4+/5 right shoulder flex/ABD strength void of pain increase to improved ease of daily task. PN: NT     Current: not met, 4-/5 to 4/5 MMT (2022)               3. Pt will report no difficulty placing a can of soup (1 lb) on a shelf at shoulder height to aid with ease of everyday activities    PN: a little difficulty   Current: met, no difficulty (2022)    ASSESSMENT/RECOMMENDATIONS:   At this time, Ms Debbie Swanson has met or is progressing towards meeting all functional therapy goals. Her main impairment is strength, which she chooses to manage at home with an HEP. She has been provided with an updated HEP, theraband, and instruction on exercise progression. She has no further questions and has been a pleasure to work with.     [x]Discontinue therapy: [x]Patient has reached or is progressing toward set goals      []Patient is non-compliant or has abdicated      []Due to lack of appreciable progress towards set goals    Agustin Palm, PT 1/14/2022 4:46 PM

## 2022-01-14 NOTE — PROGRESS NOTES
Physical Therapy Discharge Instructions      [facility]  [address]  [place], [address]  [phone] fax    Patient: Rubi Dominguez  : 1955      Continue Home Exercise Program 2 times per day for 4 weeks, then decrease to 3 times per week      Continue with    [x] Ice  as needed 1-3 times per day     [] Heat           Follow up with MD:     [] Upon completion of therapy     [x] As needed      Recommendations:     [x]   Return to activity with home program    []   Return to activity with the following modifications:       []Post Rehab Program    []Join Independent aquatic program     []Return to/join local gym        Additional Comments: It has been a pleasure to work with you! If you have any recurring issue with your shoulder or have any questions, feel free to give us a call at [phone]. My work email is [email or web address]. Have a wonderful New Year!       Ludin Flores, PT 2022 12:50 PM

## 2022-01-18 ENCOUNTER — APPOINTMENT (OUTPATIENT)
Dept: PHYSICAL THERAPY | Age: 67
End: 2022-01-18
Attending: ORTHOPAEDIC SURGERY
Payer: COMMERCIAL

## 2022-01-19 ENCOUNTER — HOSPITAL ENCOUNTER (OUTPATIENT)
Dept: LAB | Age: 67
Discharge: HOME | End: 2022-01-19

## 2022-01-19 ENCOUNTER — HOSPITAL ENCOUNTER (OUTPATIENT)
Dept: MAMMOGRAPHY | Age: 67
Discharge: HOME | End: 2022-01-19
Attending: FAMILY MEDICINE
Payer: COMMERCIAL

## 2022-01-19 ENCOUNTER — HOSPITAL ENCOUNTER (OUTPATIENT)
Dept: BONE DENSITY | Age: 67
Discharge: HOME | End: 2022-01-19
Attending: FAMILY MEDICINE
Payer: COMMERCIAL

## 2022-01-19 DIAGNOSIS — Z78.0 MENOPAUSE: ICD-10-CM

## 2022-01-19 DIAGNOSIS — Z00.00 ROUTINE GENERAL MEDICAL EXAMINATION AT A HEALTH CARE FACILITY: ICD-10-CM

## 2022-01-19 LAB — XX-LABCORP SPECIMEN COL,LCBCF: NORMAL

## 2022-01-19 PROCEDURE — 77067 SCR MAMMO BI INCL CAD: CPT

## 2022-01-19 PROCEDURE — 99001 SPECIMEN HANDLING PT-LAB: CPT

## 2022-01-19 PROCEDURE — 77080 DXA BONE DENSITY AXIAL: CPT

## 2022-01-21 ENCOUNTER — APPOINTMENT (OUTPATIENT)
Dept: PHYSICAL THERAPY | Age: 67
End: 2022-01-21
Attending: ORTHOPAEDIC SURGERY
Payer: COMMERCIAL

## 2022-01-25 ENCOUNTER — APPOINTMENT (OUTPATIENT)
Dept: PHYSICAL THERAPY | Age: 67
End: 2022-01-25
Attending: ORTHOPAEDIC SURGERY
Payer: COMMERCIAL

## 2022-01-28 ENCOUNTER — APPOINTMENT (OUTPATIENT)
Dept: PHYSICAL THERAPY | Age: 67
End: 2022-01-28
Attending: ORTHOPAEDIC SURGERY
Payer: COMMERCIAL

## 2022-01-31 ENCOUNTER — APPOINTMENT (OUTPATIENT)
Dept: PHYSICAL THERAPY | Age: 67
End: 2022-01-31
Attending: ORTHOPAEDIC SURGERY
Payer: COMMERCIAL

## 2023-04-26 ENCOUNTER — HOSPITAL ENCOUNTER (OUTPATIENT)
Facility: HOSPITAL | Age: 68
Discharge: HOME | End: 2023-04-29
Payer: MEDICARE

## 2023-04-26 DIAGNOSIS — Z12.31 VISIT FOR SCREENING MAMMOGRAM: ICD-10-CM

## 2023-04-26 PROCEDURE — 77063 BREAST TOMOSYNTHESIS BI: CPT

## 2023-08-29 ENCOUNTER — HOSPITAL ENCOUNTER (OUTPATIENT)
Facility: HOSPITAL | Age: 68
Discharge: HOME | End: 2023-09-01
Payer: MEDICARE

## 2023-08-29 DIAGNOSIS — G89.29 CHRONIC LOW BACK PAIN, UNSPECIFIED BACK PAIN LATERALITY, UNSPECIFIED WHETHER SCIATICA PRESENT: ICD-10-CM

## 2023-08-29 DIAGNOSIS — M54.50 CHRONIC LOW BACK PAIN, UNSPECIFIED BACK PAIN LATERALITY, UNSPECIFIED WHETHER SCIATICA PRESENT: ICD-10-CM

## 2023-08-29 PROCEDURE — 72100 X-RAY EXAM L-S SPINE 2/3 VWS: CPT

## 2023-09-14 ENCOUNTER — TELEPHONE (OUTPATIENT)
Age: 68
End: 2023-09-14

## 2023-09-14 NOTE — TELEPHONE ENCOUNTER
Called patient [phone] and left voice mail informing her that we received a referral for a new patient appointment. We have made her an appointment for next Thursday 9/21/2023 at 1000 am at our [facility] office. Please arrive 30 minutes early to fill out new patient paperwork. Also, if this time does not work out for her. Please let me know and we can change it.

## 2023-09-21 ENCOUNTER — OFFICE VISIT (OUTPATIENT)
Age: 68
End: 2023-09-21
Payer: MEDICARE

## 2023-09-21 VITALS
HEIGHT: 60 IN | WEIGHT: 189.8 LBS | OXYGEN SATURATION: 95 % | TEMPERATURE: 97.6 F | DIASTOLIC BLOOD PRESSURE: 68 MMHG | BODY MASS INDEX: 37.26 KG/M2 | RESPIRATION RATE: 18 BRPM | SYSTOLIC BLOOD PRESSURE: 137 MMHG | HEART RATE: 87 BPM

## 2023-09-21 DIAGNOSIS — M51.36 DDD (DEGENERATIVE DISC DISEASE), LUMBAR: ICD-10-CM

## 2023-09-21 DIAGNOSIS — M70.62 TROCHANTERIC BURSITIS OF LEFT HIP: ICD-10-CM

## 2023-09-21 DIAGNOSIS — M47.816 LUMBAR SPONDYLOSIS: Primary | ICD-10-CM

## 2023-09-21 DIAGNOSIS — M54.32 LEFT SIDED SCIATICA: ICD-10-CM

## 2023-09-21 DIAGNOSIS — N20.0 RENAL CALCULI: ICD-10-CM

## 2023-09-21 PROBLEM — M51.369 DDD (DEGENERATIVE DISC DISEASE), LUMBAR: Status: ACTIVE | Noted: 2023-09-21

## 2023-09-21 PROCEDURE — 1090F PRES/ABSN URINE INCON ASSESS: CPT | Performed by: PHYSICAL MEDICINE & REHABILITATION

## 2023-09-21 PROCEDURE — 99204 OFFICE O/P NEW MOD 45 MIN: CPT | Performed by: PHYSICAL MEDICINE & REHABILITATION

## 2023-09-21 PROCEDURE — 4004F PT TOBACCO SCREEN RCVD TLK: CPT | Performed by: PHYSICAL MEDICINE & REHABILITATION

## 2023-09-21 PROCEDURE — G8427 DOCREV CUR MEDS BY ELIG CLIN: HCPCS | Performed by: PHYSICAL MEDICINE & REHABILITATION

## 2023-09-21 PROCEDURE — 3017F COLORECTAL CA SCREEN DOC REV: CPT | Performed by: PHYSICAL MEDICINE & REHABILITATION

## 2023-09-21 PROCEDURE — G8417 CALC BMI ABV UP PARAM F/U: HCPCS | Performed by: PHYSICAL MEDICINE & REHABILITATION

## 2023-09-21 PROCEDURE — G8399 PT W/DXA RESULTS DOCUMENT: HCPCS | Performed by: PHYSICAL MEDICINE & REHABILITATION

## 2023-09-21 PROCEDURE — 1123F ACP DISCUSS/DSCN MKR DOCD: CPT | Performed by: PHYSICAL MEDICINE & REHABILITATION

## 2023-09-21 RX ORDER — GABAPENTIN 100 MG/1
100 CAPSULE ORAL 3 TIMES DAILY
Qty: 90 CAPSULE | Refills: 1 | Status: SHIPPED | OUTPATIENT
Start: 2023-09-21 | End: 2023-11-20

## 2023-09-21 RX ORDER — METHYLPREDNISOLONE 4 MG/1
TABLET ORAL
Qty: 1 KIT | Refills: 0 | Status: SHIPPED | OUTPATIENT
Start: 2023-09-21

## 2023-09-21 NOTE — PATIENT INSTRUCTIONS
feet flat on the floor. Bring one knee to your chest, keeping the other foot flat on the floor (or keeping the other leg straight, whichever feels better on your lower back). Keep your lower back pressed to the floor. Hold for at least 15 to 30 seconds. Relax, and lower the knee to the starting position. Repeat with the other leg. Repeat 2 to 4 times with each leg. To get more stretch, put your other leg flat on the floor while pulling your knee to your chest.  Curl-ups    Lie on the floor on your back with your knees bent at a 90-degree angle. Your feet should be flat on the floor, about 12 inches from your buttocks. Cross your arms over your chest. If this bothers your neck, try putting your hands behind your neck (not your head), with your elbows spread apart. Slowly tighten your belly muscles and raise your shoulder blades off the floor. Keep your head in line with your body, and do not press your chin to your chest.  Hold this position for 1 or 2 seconds, then slowly lower yourself back down to the floor. Repeat 8 to 12 times. Pelvic tilt    Lie on your back with your knees bent and your feet flat on the floor. Tighten your belly muscles and buttocks, and press your lower back to the floor. You should feel your hips and pelvis rock back. Hold for about 6 seconds while breathing smoothly, and then relax. Repeat 8 to 12 times. Heel dig bridging    Lie on your back with both knees bent and your ankles bent so that only your heels are digging into the floor. Your knees should be bent about 90 degrees. Then push your heels into the floor, squeeze your buttocks, and lift your hips off the floor until your shoulders, hips, and knees are all in a straight line. Hold for about 6 seconds as you continue to breathe normally, and then slowly lower your hips back down to the floor and rest for up to 10 seconds. Do 8 to 12 repetitions.   Hamstring stretch in doorway    Lie on your back in a doorway, with one

## 2023-09-21 NOTE — PROGRESS NOTES
Ailyn Cancino presents today for   Chief Complaint   Patient presents with    Back Problem    Pain    Lower Back Pain       Is someone accompanying this pt? no    Is the patient using any DME equipment during OV? no    Depression Screening:       No data to display                Learning Assessment:  No flowsheet data found. Abuse Screening:       No data to display                Fall Risk  No flowsheet data found. OPIOID RISK TOOL  No flowsheet data found. Coordination of Care:  1. Have you been to the ER, urgent care clinic since your last visit? no  Hospitalized since your last visit? no    2. Have you seen or consulted any other health care providers outside of the [address] [place] [address] since your last visit? no Include any pap smears or colon screening.  no
software and may contain unintended errors.

## 2023-10-03 ENCOUNTER — HOSPITAL ENCOUNTER (OUTPATIENT)
Facility: HOSPITAL | Age: 68
Discharge: HOME | End: 2023-10-06
Attending: PHYSICAL MEDICINE & REHABILITATION
Payer: MEDICARE

## 2023-10-03 DIAGNOSIS — M47.816 LUMBAR SPONDYLOSIS: ICD-10-CM

## 2023-10-03 DIAGNOSIS — M54.32 LEFT SIDED SCIATICA: ICD-10-CM

## 2023-10-03 DIAGNOSIS — M51.36 DDD (DEGENERATIVE DISC DISEASE), LUMBAR: ICD-10-CM

## 2023-10-03 PROCEDURE — 72148 MRI LUMBAR SPINE W/O DYE: CPT

## 2023-11-15 ENCOUNTER — OFFICE VISIT (OUTPATIENT)
Age: 68
End: 2023-11-15
Payer: MEDICARE

## 2023-11-15 VITALS
SYSTOLIC BLOOD PRESSURE: 136 MMHG | OXYGEN SATURATION: 98 % | WEIGHT: 188 LBS | BODY MASS INDEX: 36.91 KG/M2 | HEART RATE: 79 BPM | TEMPERATURE: 96.8 F | DIASTOLIC BLOOD PRESSURE: 66 MMHG | HEIGHT: 60 IN

## 2023-11-15 DIAGNOSIS — M48.062 SPINAL STENOSIS OF LUMBAR REGION WITH NEUROGENIC CLAUDICATION: ICD-10-CM

## 2023-11-15 DIAGNOSIS — M54.16 LEFT LUMBAR RADICULITIS: Primary | ICD-10-CM

## 2023-11-15 PROCEDURE — G8417 CALC BMI ABV UP PARAM F/U: HCPCS | Performed by: PHYSICAL MEDICINE & REHABILITATION

## 2023-11-15 PROCEDURE — G8484 FLU IMMUNIZE NO ADMIN: HCPCS | Performed by: PHYSICAL MEDICINE & REHABILITATION

## 2023-11-15 PROCEDURE — 99214 OFFICE O/P EST MOD 30 MIN: CPT | Performed by: PHYSICAL MEDICINE & REHABILITATION

## 2023-11-15 PROCEDURE — 1123F ACP DISCUSS/DSCN MKR DOCD: CPT | Performed by: PHYSICAL MEDICINE & REHABILITATION

## 2023-11-15 PROCEDURE — 4004F PT TOBACCO SCREEN RCVD TLK: CPT | Performed by: PHYSICAL MEDICINE & REHABILITATION

## 2023-11-15 PROCEDURE — 3017F COLORECTAL CA SCREEN DOC REV: CPT | Performed by: PHYSICAL MEDICINE & REHABILITATION

## 2023-11-15 PROCEDURE — G8399 PT W/DXA RESULTS DOCUMENT: HCPCS | Performed by: PHYSICAL MEDICINE & REHABILITATION

## 2023-11-15 PROCEDURE — 1090F PRES/ABSN URINE INCON ASSESS: CPT | Performed by: PHYSICAL MEDICINE & REHABILITATION

## 2023-11-15 PROCEDURE — G8428 CUR MEDS NOT DOCUMENT: HCPCS | Performed by: PHYSICAL MEDICINE & REHABILITATION

## 2023-11-15 RX ORDER — DULOXETIN HYDROCHLORIDE 20 MG/1
20 CAPSULE, DELAYED RELEASE ORAL
Qty: 30 CAPSULE | Refills: 1 | Status: SHIPPED | OUTPATIENT
Start: 2023-11-15

## 2023-11-15 NOTE — PROGRESS NOTES
Norm Gonzalez presents today for   Chief Complaint   Patient presents with    Lower Back Pain       Is someone accompanying this pt? Yes, spouse     Is the patient using any DME equipment during OV? no    Coordination of Care:  1. Have you been to the ER, urgent care clinic since your last visit? no  Hospitalized since your last visit? no    2. Have you seen or consulted any other health care providers outside of the [address] [place] [address] since your last visit? no Include any pap smears or colon screening.  no

## 2023-11-16 PROBLEM — M48.062 SPINAL STENOSIS OF LUMBAR REGION WITH NEUROGENIC CLAUDICATION: Status: ACTIVE | Noted: 2023-11-16

## 2023-11-16 NOTE — PROGRESS NOTES
[facility]    [address], [address] [place] [address], [address] [place] [address]   Phone: [phone]  Fax: [phone]        Fran Fong  : 1955  PCP: Mary Tai MD    PROGRESS NOTE      ASSESSMENT AND PLAN    Elida Blanton was seen today for lower back pain. Diagnoses and all orders for this visit:    Left lumbar radiculitis  -     DULoxetine (CYMBALTA) 20 MG extended release capsule; Take 1 capsule by mouth Daily with supper  -     Cancel: Ambulatory Referral to Ortho Injection  -     Ambulatory Referral to Ortho Injection    Spinal stenosis of lumbar region with neurogenic claudication  -     DULoxetine (CYMBALTA) 20 MG extended release capsule; Take 1 capsule by mouth Daily with supper  -     Cancel: Ambulatory Referral to Ortho Injection        Ivone Costa is a 79 y.o. 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 Discussed risks, benefits, alternatives, and limitations of selective nerve root blocks. Schedule for left L2 and L3. SNRB  DC gabapentin. Trial of duloxetine. Discussed side effects. HISTORY OF PRESENT ILLNESS      Ivone Costa is a 79 y.o. female presents for follow up of lumbar MRI and routine gabapentin. Lumbar images reviewed with patient and spouse. She reports no significant benefit with gabapentin. She is not on chronic medications of any sort. Reports that it is difficult for her to remember to take her medication. Patient continues to have low back pain radiating to the bilateral buttocks. On the left side this radiates into her anterior knee. No knee effusion. 11/15/2023    12:00 PM   AMB PAIN ASSESSMENT   Location of Pain Back   Severity of Pain 4   Quality of Pain Throbbing;Aching; Other (Comment)   Duration of Pain Persistent   Frequency of Pain Constant   Aggravating Factors Other (Comment); Stairs   Limiting

## 2023-11-16 NOTE — H&P (VIEW-ONLY)
[facility]    [address], [address] [place] [address]   Phone: [phone]  Fax: [phone]        Freddie Peña  : 1955  PCP: Clara Ross MD    PROGRESS NOTE      ASSESSMENT AND PLAN    Jourdan Jarrell was seen today for lower back pain. Diagnoses and all orders for this visit:    Left lumbar radiculitis  -     DULoxetine (CYMBALTA) 20 MG extended release capsule; Take 1 capsule by mouth Daily with supper  -     Cancel: Ambulatory Referral to Ortho Injection  -     Ambulatory Referral to Ortho Injection    Spinal stenosis of lumbar region with neurogenic claudication  -     DULoxetine (CYMBALTA) 20 MG extended release capsule; Take 1 capsule by mouth Daily with supper  -     Cancel: Ambulatory Referral to Ortho Injection        Billi Galeazzi is a 79 y.o. 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 Discussed risks, benefits, alternatives, and limitations of selective nerve root blocks. Schedule for left L2 and L3. SNRB  DC gabapentin. Trial of duloxetine. Discussed side effects. HISTORY OF PRESENT ILLNESS      Billi Galeazzi is a 79 y.o. female presents for follow up of lumbar MRI and routine gabapentin. Lumbar images reviewed with patient and spouse. She reports no significant benefit with gabapentin. She is not on chronic medications of any sort. Reports that it is difficult for her to remember to take her medication. Patient continues to have low back pain radiating to the bilateral buttocks. On the left side this radiates into her anterior knee. No knee effusion. 11/15/2023    12:00 PM   AMB PAIN ASSESSMENT   Location of Pain Back   Severity of Pain 4   Quality of Pain Throbbing;Aching; Other (Comment)   Duration of Pain Persistent   Frequency of Pain Constant   Aggravating Factors Other (Comment); Stairs   Limiting

## 2023-11-16 NOTE — PROGRESS NOTES
[facility]    [address], [address] [place] [address]   Phone: [phone]  Fax: [phone]        Bhavana Robles  : 1955  PCP: Laura Aguilar MD    PROGRESS NOTE      ASSESSMENT AND PLAN    Jose Dominguez was seen today for lower back pain. Diagnoses and all orders for this visit:    Spinal stenosis of lumbar region with neurogenic claudication  -     DULoxetine (CYMBALTA) 20 MG extended release capsule; Take 1 capsule by mouth Daily with supper  -     Cancel: Ambulatory Referral to Ortho Injection    Left lumbar radiculitis  -     DULoxetine (CYMBALTA) 20 MG extended release capsule; Take 1 capsule by mouth Daily with supper  -     Cancel: Ambulatory Referral to Ortho Injection  -     Ambulatory Referral to Ortho Injection        Olimpia Donato is a 79 y.o. 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 Discussed risks, benefits, alternatives, and limitations of selective nerve root blocks. Schedule for left L2 and L3. SNRB  DC gabapentin. Trial of duloxetine. Discussed side effects. HISTORY OF PRESENT ILLNESS      Olimpia Donato is a 79 y.o. female presents for follow up of lumbar MRI and routine gabapentin. Lumbar images reviewed with patient and spouse. She reports no significant benefit with gabapentin. She is not on chronic medications of any sort. Reports that it is difficult for her to remember to take her medication. Patient continues to have low back pain radiating to the bilateral buttocks. On the left side this radiates into her anterior knee. No knee effusion. 11/15/2023    12:00 PM   AMB PAIN ASSESSMENT   Location of Pain Back   Severity of Pain 4   Quality of Pain Throbbing;Aching; Other (Comment)   Duration of Pain Persistent   Frequency of Pain Constant   Aggravating Factors Other (Comment); Stairs   Limiting

## 2023-11-21 ENCOUNTER — HOSPITAL ENCOUNTER (OUTPATIENT)
Facility: HOSPITAL | Age: 68
Discharge: HOME | End: 2023-11-24
Payer: MEDICARE

## 2023-11-21 VITALS
SYSTOLIC BLOOD PRESSURE: 155 MMHG | DIASTOLIC BLOOD PRESSURE: 80 MMHG | RESPIRATION RATE: 17 BRPM | OXYGEN SATURATION: 95 % | HEART RATE: 70 BPM | TEMPERATURE: 97.8 F

## 2023-11-21 PROCEDURE — 64483 NJX AA&/STRD TFRM EPI L/S 1: CPT | Performed by: PHYSICAL MEDICINE & REHABILITATION

## 2023-11-21 PROCEDURE — 6360000002 HC RX W HCPCS: Performed by: PHYSICAL MEDICINE & REHABILITATION

## 2023-11-21 PROCEDURE — 64484 NJX AA&/STRD TFRM EPI L/S EA: CPT

## 2023-11-21 PROCEDURE — 64484 NJX AA&/STRD TFRM EPI L/S EA: CPT | Performed by: PHYSICAL MEDICINE & REHABILITATION

## 2023-11-21 PROCEDURE — 64483 NJX AA&/STRD TFRM EPI L/S 1: CPT

## 2023-11-21 PROCEDURE — 6360000004 HC RX CONTRAST MEDICATION: Performed by: PHYSICAL MEDICINE & REHABILITATION

## 2023-11-21 PROCEDURE — 2500000003 HC RX 250 WO HCPCS: Performed by: PHYSICAL MEDICINE & REHABILITATION

## 2023-11-21 PROCEDURE — 6370000000 HC RX 637 (ALT 250 FOR IP): Performed by: PHYSICAL MEDICINE & REHABILITATION

## 2023-11-21 RX ORDER — DIAZEPAM 5 MG/1
2.5 TABLET ORAL ONCE
Status: COMPLETED | OUTPATIENT
Start: 2023-11-21 | End: 2023-11-21

## 2023-11-21 RX ORDER — DIAZEPAM 5 MG/1
5 TABLET ORAL ONCE
Status: COMPLETED | OUTPATIENT
Start: 2023-11-21 | End: 2023-11-21

## 2023-11-21 RX ORDER — DEXAMETHASONE SODIUM PHOSPHATE 10 MG/ML
10 INJECTION, SOLUTION INTRAMUSCULAR; INTRAVENOUS ONCE
Status: COMPLETED | OUTPATIENT
Start: 2023-11-21 | End: 2023-11-21

## 2023-11-21 RX ORDER — DIAZEPAM 5 MG/1
10 TABLET ORAL ONCE
Status: COMPLETED | OUTPATIENT
Start: 2023-11-21 | End: 2023-11-21

## 2023-11-21 RX ORDER — LIDOCAINE HYDROCHLORIDE 10 MG/ML
30 INJECTION, SOLUTION EPIDURAL; INFILTRATION; INTRACAUDAL; PERINEURAL ONCE
Status: COMPLETED | OUTPATIENT
Start: 2023-11-21 | End: 2023-11-21

## 2023-11-21 RX ORDER — IOPAMIDOL 408 MG/ML
4 INJECTION, SOLUTION INTRATHECAL
Status: COMPLETED | OUTPATIENT
Start: 2023-11-21 | End: 2023-11-21

## 2023-11-21 RX ADMIN — IOPAMIDOL 1 ML: 408 INJECTION, SOLUTION INTRATHECAL at 12:54

## 2023-11-21 RX ADMIN — DEXAMETHASONE SODIUM PHOSPHATE 10 MG: 10 INJECTION, SOLUTION INTRAMUSCULAR; INTRAVENOUS at 12:55

## 2023-11-21 RX ADMIN — LIDOCAINE HYDROCHLORIDE 15 ML: 10 INJECTION, SOLUTION EPIDURAL; INFILTRATION; INTRACAUDAL; PERINEURAL at 12:51

## 2023-11-21 RX ADMIN — DIAZEPAM 2.5 MG: 5 TABLET ORAL at 12:10

## 2023-11-21 ASSESSMENT — PAIN SCALES - GENERAL: PAINLEVEL_OUTOF10: 0

## 2023-11-21 ASSESSMENT — PAIN - FUNCTIONAL ASSESSMENT: PAIN_FUNCTIONAL_ASSESSMENT: 0-10

## 2023-11-21 NOTE — INTERVAL H&P NOTE
Update History & Physical    The patient's History and Physical of November 16, 2023 was reviewed. There was no change. The surgical site was confirmed by the patient and me. Plan: The risks, benefits, expected outcome, and alternative to the recommended procedure have been discussed with the patient. Patient understands and wants to proceed with the procedure.      Electronically signed by Hannah Moe MD on 11/21/2023 at 12:42 PM

## 2023-11-21 NOTE — PROCEDURES
Visual Analog Scale:  pre-5; post-0.                                              Maritza Murguia MD  November 21, 2023

## 2024-01-05 ENCOUNTER — TELEPHONE (OUTPATIENT)
Age: 69
End: 2024-01-05

## 2024-01-05 NOTE — TELEPHONE ENCOUNTER
Patient called regarding her most recent referral from Dr. Hassan for a hip injection.      She says that she just saw him on Tuesday 01/02/2024.    Patient can be reached at [phone].

## 2024-01-25 ENCOUNTER — OFFICE VISIT (OUTPATIENT)
Age: 69
End: 2024-01-25
Payer: MEDICARE

## 2024-01-25 VITALS
HEIGHT: 60 IN | TEMPERATURE: 97.1 F | OXYGEN SATURATION: 95 % | HEART RATE: 93 BPM | BODY MASS INDEX: 36.52 KG/M2 | WEIGHT: 186 LBS

## 2024-01-25 DIAGNOSIS — M25.552 LEFT HIP PAIN: Primary | ICD-10-CM

## 2024-01-25 DIAGNOSIS — M47.816 LUMBAR SPONDYLOSIS: ICD-10-CM

## 2024-01-25 DIAGNOSIS — M51.26 PROTRUSION OF LUMBAR INTERVERTEBRAL DISC: ICD-10-CM

## 2024-01-25 PROCEDURE — G8484 FLU IMMUNIZE NO ADMIN: HCPCS | Performed by: PHYSICAL MEDICINE & REHABILITATION

## 2024-01-25 PROCEDURE — G8428 CUR MEDS NOT DOCUMENT: HCPCS | Performed by: PHYSICAL MEDICINE & REHABILITATION

## 2024-01-25 PROCEDURE — 3017F COLORECTAL CA SCREEN DOC REV: CPT | Performed by: PHYSICAL MEDICINE & REHABILITATION

## 2024-01-25 PROCEDURE — 73502 X-RAY EXAM HIP UNI 2-3 VIEWS: CPT | Performed by: PHYSICAL MEDICINE & REHABILITATION

## 2024-01-25 PROCEDURE — 1123F ACP DISCUSS/DSCN MKR DOCD: CPT | Performed by: PHYSICAL MEDICINE & REHABILITATION

## 2024-01-25 PROCEDURE — 4004F PT TOBACCO SCREEN RCVD TLK: CPT | Performed by: PHYSICAL MEDICINE & REHABILITATION

## 2024-01-25 PROCEDURE — G8399 PT W/DXA RESULTS DOCUMENT: HCPCS | Performed by: PHYSICAL MEDICINE & REHABILITATION

## 2024-01-25 PROCEDURE — G8417 CALC BMI ABV UP PARAM F/U: HCPCS | Performed by: PHYSICAL MEDICINE & REHABILITATION

## 2024-01-25 PROCEDURE — 1090F PRES/ABSN URINE INCON ASSESS: CPT | Performed by: PHYSICAL MEDICINE & REHABILITATION

## 2024-01-25 PROCEDURE — 99213 OFFICE O/P EST LOW 20 MIN: CPT | Performed by: PHYSICAL MEDICINE & REHABILITATION

## 2024-01-25 ASSESSMENT — PATIENT HEALTH QUESTIONNAIRE - PHQ9
SUM OF ALL RESPONSES TO PHQ QUESTIONS 1-9: 0
SUM OF ALL RESPONSES TO PHQ QUESTIONS 1-9: 0
2. FEELING DOWN, DEPRESSED OR HOPELESS: 0
SUM OF ALL RESPONSES TO PHQ QUESTIONS 1-9: 0
SUM OF ALL RESPONSES TO PHQ9 QUESTIONS 1 & 2: 0
SUM OF ALL RESPONSES TO PHQ QUESTIONS 1-9: 0
1. LITTLE INTEREST OR PLEASURE IN DOING THINGS: 0

## 2024-01-25 NOTE — PROGRESS NOTES
Yue Serra presents today for   Chief Complaint   Patient presents with    Back Pain     Left side    Leg Pain     Pain radiating to the front of the left leg just above the knee       Is someone accompanying this pt? Yes, male    Is the patient using any DME equipment during OV? no    Coordination of Care:  1. Have you been to the ER, urgent care clinic since your last visit? Urgent care for mouth issues from the prednisone  Hospitalized since your last visit? no    2. Have you seen or consulted any other health care providers outside of the [facility] since your last visit? Ortho Include any pap smears or colon screening. no        
gait, without assistive device.    No pain with left hip range of motion, negative Frank's test.  Negative roll.  SLR is negative.  Negative SI joint thrust.    Lower extremity strength is intact for her hip flexors, hamstrings, dorsiflexors, plantar flexors, and EHL.                This note was created using Dragon transcription software and may contain unintended errors.

## 2024-02-14 ENCOUNTER — PROCEDURE VISIT (OUTPATIENT)
Age: 69
End: 2024-02-14
Payer: MEDICARE

## 2024-02-14 VITALS
BODY MASS INDEX: 36.91 KG/M2 | OXYGEN SATURATION: 97 % | HEART RATE: 85 BPM | WEIGHT: 188 LBS | TEMPERATURE: 98.5 F | HEIGHT: 60 IN

## 2024-02-14 DIAGNOSIS — M25.552 LEFT HIP PAIN: Primary | ICD-10-CM

## 2024-02-14 DIAGNOSIS — M53.3 SACROILIAC JOINT PAIN: ICD-10-CM

## 2024-02-14 PROCEDURE — G8427 DOCREV CUR MEDS BY ELIG CLIN: HCPCS | Performed by: PHYSICAL MEDICINE & REHABILITATION

## 2024-02-14 PROCEDURE — G8484 FLU IMMUNIZE NO ADMIN: HCPCS | Performed by: PHYSICAL MEDICINE & REHABILITATION

## 2024-02-14 PROCEDURE — 1090F PRES/ABSN URINE INCON ASSESS: CPT | Performed by: PHYSICAL MEDICINE & REHABILITATION

## 2024-02-14 PROCEDURE — 99214 OFFICE O/P EST MOD 30 MIN: CPT | Performed by: PHYSICAL MEDICINE & REHABILITATION

## 2024-02-14 PROCEDURE — G8399 PT W/DXA RESULTS DOCUMENT: HCPCS | Performed by: PHYSICAL MEDICINE & REHABILITATION

## 2024-02-14 PROCEDURE — 3017F COLORECTAL CA SCREEN DOC REV: CPT | Performed by: PHYSICAL MEDICINE & REHABILITATION

## 2024-02-14 PROCEDURE — 4004F PT TOBACCO SCREEN RCVD TLK: CPT | Performed by: PHYSICAL MEDICINE & REHABILITATION

## 2024-02-14 PROCEDURE — 20611 DRAIN/INJ JOINT/BURSA W/US: CPT | Performed by: PHYSICAL MEDICINE & REHABILITATION

## 2024-02-14 PROCEDURE — 1123F ACP DISCUSS/DSCN MKR DOCD: CPT | Performed by: PHYSICAL MEDICINE & REHABILITATION

## 2024-02-14 PROCEDURE — G8417 CALC BMI ABV UP PARAM F/U: HCPCS | Performed by: PHYSICAL MEDICINE & REHABILITATION

## 2024-02-14 RX ORDER — TRIAMCINOLONE ACETONIDE 40 MG/ML
40 INJECTION, SUSPENSION INTRA-ARTICULAR; INTRAMUSCULAR ONCE
Status: COMPLETED | OUTPATIENT
Start: 2024-02-14 | End: 2024-02-14

## 2024-02-14 RX ORDER — LIDOCAINE HYDROCHLORIDE 10 MG/ML
6 INJECTION, SOLUTION INFILTRATION; PERINEURAL ONCE
Status: COMPLETED | OUTPATIENT
Start: 2024-02-14 | End: 2024-02-14

## 2024-02-14 RX ADMIN — TRIAMCINOLONE ACETONIDE 40 MG: 40 INJECTION, SUSPENSION INTRA-ARTICULAR; INTRAMUSCULAR at 16:11

## 2024-02-14 RX ADMIN — LIDOCAINE HYDROCHLORIDE 6 ML: 10 INJECTION, SOLUTION INFILTRATION; PERINEURAL at 16:10

## 2024-02-14 NOTE — PROGRESS NOTES
[facility]  [address] [place] [address], [address]  [place] [address]  Phone: [phone]  Fax: [phone]      Patient: Yue Serra                                                                              MRN: [id number]        YOB: 1955          AGE: 68 y.o.             PCP: Devan Woodson MD  Date:  02/14/24    Reason for Consultation: Follow-up (Left hip injection)      HPI:  Yue Serra is a 68 y.o. female  who presents with left low back, does radiate to anterior thigh.  The pain began about 1 year ago.  Denies any precipitating incident or trauma.   She has seen Dr. Arevalo who got an MRI of her lumbar spine 10/4/2023 which did show  degenerative changes and small disc protrusion into left foramen at L2/3 . She tired left L2, and 3 SNRB without relief.  She was referred to Dr. Hassan who suggested further eval of her left hip. X-ray left hip unremarkable minimal OA     Neurologic symptoms: No numbness, tingling, weakness, bowel or bladder changes.  No recent falls      Location: The pain is located in the left low back   Radiation: The pain does radiate left anterior and lateral thigh .    Pain Score: Currently: 7/10    Quality: Pain is of a dull, achingquality.    Aggravating: Pain is exacerbated by walking, standing, and stairs   Alleviating: The pain is alleviated by lying down    Prior Treatments:  Physical therapy: No  Chiropractic treatments   Injections:Yes  11/21/2023- no relief   left L2 Selective Nerve Root Block  left L3 Selective Nerve Root Block  Surgery:No  Previous Medications: medrol pack- side effects  Current Medications:  Previous work-up has included:   MRI lumbar spine 10/4/2-23  T12-L1:  Mild intradiscal degenerative changes are present     L1-L2:    Intradiscal signal loss, small amount of anterior osteophytic formation Central canal is nonstenotic Neuroforamina nonstenotic     L2-L3:    There is a left

## 2024-02-28 ENCOUNTER — TELEMEDICINE (OUTPATIENT)
Age: 69
End: 2024-02-28
Payer: MEDICARE

## 2024-02-28 DIAGNOSIS — M53.3 SACROILIAC JOINT PAIN: Primary | ICD-10-CM

## 2024-02-28 DIAGNOSIS — M51.36 DDD (DEGENERATIVE DISC DISEASE), LUMBAR: ICD-10-CM

## 2024-02-28 PROCEDURE — 99442 PR PHYS/QHP TELEPHONE EVALUATION 11-20 MIN: CPT | Performed by: PHYSICAL MEDICINE & REHABILITATION

## 2024-02-28 NOTE — H&P (VIEW-ONLY)
[facility]  [address] [place] [address], [address]  [place] [address]  Phone: [phone]  Fax: [phone]      Patient: Yue Serra                                                                              MRN: [id number]        YOB: 1955          AGE: 68 y.o.             PCP: Devan Woodson MD  Date:  02/28/24    Reason for Consultation: Hip Pain and Pain      HPI:  Yue Serra is a 68 y.o. female  who presents with left low back, does radiate to anterior thigh.  The pain began about 1 year ago.  Denies any precipitating incident or trauma.   She has seen Dr. Arevalo who got an MRI of her lumbar spine 10/4/2023 which did show  degenerative changes and small disc protrusion into left foramen at L2/3 . She tired left L2, and 3 SNRB without relief.  She was referred to Dr. Hassan who suggested further eval of her left hip. X-ray left hip unremarkable minimal OA . We tried an IA left hip injection 2/14/2024 without much relief.  On exam her pain was most localized to her SI join and SI provacative maneuvers aggravated her pain     Neurologic symptoms: No numbness, tingling, weakness, bowel or bladder changes.  No recent falls      Location: The pain is located in the left low back   Radiation: The pain does radiate left anterior and lateral thigh .    Pain Score: Currently: 7/10    Quality: Pain is of a dull, achingquality.    Aggravating: Pain is exacerbated by walking, standing, and stairs   Alleviating: The pain is alleviated by lying down    Prior Treatments:  Physical therapy: No  Chiropractic treatments   Injections:Yes  11/21/2023- no relief   left L2 Selective Nerve Root Block  left L3 Selective Nerve Root Block  Surgery:No  Previous Medications: medrol pack- side effects  Current Medications:  Previous work-up has included:   MRI lumbar spine 10/4/2-23  T12-L1:  Mild intradiscal degenerative changes are present     L1-L2:    Intradiscal

## 2024-02-28 NOTE — PROGRESS NOTES
Yue Serra presents today for   Chief Complaint   Patient presents with    Hip Pain    Pain       Is someone accompanying this pt? no    Is the patient using any DME equipment during OV? no    Depression Screening:       No data to display                Learning Assessment:  Failed to redirect to the Timeline version of the [employer] SmartLink.    Abuse Screening:       No data to display                Fall Risk  Failed to redirect to the Timeline version of the [employer] SmartLink.    OPIOID RISK TOOL  Failed to redirect to the Timeline version of the [employer] SmartLink.    Coordination of Care:  1. Have you been to the ER, urgent care clinic since your last visit? no  Hospitalized since your last visit? no    2. Have you seen or consulted any other health care providers outside of the [facility] since your last visit? no Include any pap smears or colon screening. no      
has not had a related appointment within my department in the past 7 days or scheduled within the next 24 hours.   The patient was located at Home: [address] [place] [address] [place] [address].  The provider was located at Facility (Appt Dept): [address] [facility]  [address]  [place] [address].    Note: not billable if this call serves to triage the patient into an appointment for the relevant concern  {STOP! Confirm you are appropriately licensed, registered, or certified to deliver care in the state where the patient is located as indicated above. If you are not or unsure, please re-schedule the visit. yes  Yue Serra is a 68 y.o. female evaluated via telephone on 2/28/2024 for Hip Pain and Pain  .        Victor M Enrique MD

## 2024-03-04 ENCOUNTER — TELEPHONE (OUTPATIENT)
Age: 69
End: 2024-03-04

## 2024-03-19 ENCOUNTER — HOSPITAL ENCOUNTER (OUTPATIENT)
Facility: HOSPITAL | Age: 69
Discharge: HOME | End: 2024-03-22
Payer: MEDICARE

## 2024-03-19 VITALS
DIASTOLIC BLOOD PRESSURE: 70 MMHG | OXYGEN SATURATION: 95 % | TEMPERATURE: 98.2 F | RESPIRATION RATE: 16 BRPM | HEART RATE: 72 BPM | SYSTOLIC BLOOD PRESSURE: 130 MMHG

## 2024-03-19 PROCEDURE — 27096 INJECT SACROILIAC JOINT: CPT

## 2024-03-19 PROCEDURE — 27096 INJECT SACROILIAC JOINT: CPT | Performed by: PHYSICAL MEDICINE & REHABILITATION

## 2024-03-19 PROCEDURE — 6360000002 HC RX W HCPCS: Performed by: PHYSICAL MEDICINE & REHABILITATION

## 2024-03-19 PROCEDURE — 6370000000 HC RX 637 (ALT 250 FOR IP): Performed by: PHYSICAL MEDICINE & REHABILITATION

## 2024-03-19 PROCEDURE — 2500000003 HC RX 250 WO HCPCS: Performed by: PHYSICAL MEDICINE & REHABILITATION

## 2024-03-19 RX ORDER — LIDOCAINE HYDROCHLORIDE 10 MG/ML
30 INJECTION, SOLUTION EPIDURAL; INFILTRATION; INTRACAUDAL; PERINEURAL ONCE
Status: COMPLETED | OUTPATIENT
Start: 2024-03-19 | End: 2024-03-19

## 2024-03-19 RX ORDER — IOPAMIDOL 408 MG/ML
4 INJECTION, SOLUTION INTRATHECAL
Status: DISCONTINUED | OUTPATIENT
Start: 2024-03-19 | End: 2024-03-23 | Stop reason: HOSPADM

## 2024-03-19 RX ORDER — DEXAMETHASONE SODIUM PHOSPHATE 10 MG/ML
10 INJECTION, SOLUTION INTRAMUSCULAR; INTRAVENOUS ONCE
Status: COMPLETED | OUTPATIENT
Start: 2024-03-19 | End: 2024-03-19

## 2024-03-19 RX ORDER — DIAZEPAM 5 MG/1
5 TABLET ORAL ONCE
Status: COMPLETED | OUTPATIENT
Start: 2024-03-19 | End: 2024-03-19

## 2024-03-19 RX ORDER — DIAZEPAM 5 MG/1
10 TABLET ORAL ONCE
Status: COMPLETED | OUTPATIENT
Start: 2024-03-19 | End: 2024-03-19

## 2024-03-19 RX ORDER — DIAZEPAM 5 MG/1
2.5 TABLET ORAL ONCE
Status: COMPLETED | OUTPATIENT
Start: 2024-03-19 | End: 2024-03-19

## 2024-03-19 RX ADMIN — DEXAMETHASONE SODIUM PHOSPHATE 10 MG: 10 INJECTION, SOLUTION INTRAMUSCULAR; INTRAVENOUS at 09:24

## 2024-03-19 RX ADMIN — DIAZEPAM 5 MG: 5 TABLET ORAL at 08:17

## 2024-03-19 RX ADMIN — LIDOCAINE HYDROCHLORIDE 15 ML: 10 INJECTION, SOLUTION EPIDURAL; INFILTRATION; INTRACAUDAL; PERINEURAL at 09:18

## 2024-03-19 ASSESSMENT — PAIN DESCRIPTION - DESCRIPTORS: DESCRIPTORS: ACHING

## 2024-03-19 ASSESSMENT — PAIN - FUNCTIONAL ASSESSMENT: PAIN_FUNCTIONAL_ASSESSMENT: 0-10

## 2024-03-19 ASSESSMENT — PAIN SCALES - GENERAL: PAINLEVEL_OUTOF10: 0

## 2024-03-19 NOTE — INTERVAL H&P NOTE
Update History & Physical    The patient's History and Physical of February 28, 2024 was reviewed. There was no change. The surgical site was confirmed by the patient and me.     Plan: The risks, benefits, expected outcome, and alternative to the recommended procedure have been discussed with the patient. Patient understands and wants to proceed with the procedure.     Electronically signed by ASHWINI MCCOLLUM MD on 3/19/2024 at 9:14 AM

## 2024-03-19 NOTE — PROCEDURES
Unilateral Sacroiliac Joint Injection Procedure Note    Patient Name: Yue Serra    Date of Procedure: March 19, 2024    Preoperative Diagnosis:Sacroiliac Dysfunction    Post Operative Diagnosis: same    Procedure: SI Joint Injection, Intra-articular and extra-articular - Unilateral  left    Consent: Informed consent was obtained prior to the procedure. The patient was given the opportunity to ask questions regarding the procedure and its associated risks. In addition to the potential risks associated with the procedure itself, the patient was informed both verbally and in writing of potential side effects of the use of corticosteroids. The patient appeared to comprehend the informed consent and desired to have the procedure performed.    Procedure: The patient was placed in the prone position on the flouroscopy table and the back was prepped and draped in the usual sterile manner.After local Lidocaine 1% infiltration, a #22 gauge spinal needle was then advanced to lie within the Sacroiliac Joint.     A total of 10 mg of Dexamethasone  and 5 ml of Lidocaine was introduced in and around the joint.     The injection area was cleaned and bandaids applied. No excessive bleeding was noted. Patient dressed and was discharged to home with instructions.    Discussion:  The patient tolerated the procedure well.Patient reported elias-procedural pain on Visual Analog Scale:  pre-6; post-0.        ASHWINI MCCOLLUM MD  March 19, 2024

## 2024-04-17 ENCOUNTER — OFFICE VISIT (OUTPATIENT)
Age: 69
End: 2024-04-17
Payer: MEDICARE

## 2024-04-17 VITALS
TEMPERATURE: 96.9 F | OXYGEN SATURATION: 96 % | HEART RATE: 93 BPM | HEIGHT: 60 IN | BODY MASS INDEX: 36.52 KG/M2 | DIASTOLIC BLOOD PRESSURE: 67 MMHG | SYSTOLIC BLOOD PRESSURE: 128 MMHG | WEIGHT: 186 LBS

## 2024-04-17 DIAGNOSIS — M53.3 SACROILIAC JOINT PAIN: Primary | ICD-10-CM

## 2024-04-17 DIAGNOSIS — M51.36 DDD (DEGENERATIVE DISC DISEASE), LUMBAR: ICD-10-CM

## 2024-04-17 PROCEDURE — G8399 PT W/DXA RESULTS DOCUMENT: HCPCS | Performed by: PHYSICAL MEDICINE & REHABILITATION

## 2024-04-17 PROCEDURE — G8417 CALC BMI ABV UP PARAM F/U: HCPCS | Performed by: PHYSICAL MEDICINE & REHABILITATION

## 2024-04-17 PROCEDURE — 3017F COLORECTAL CA SCREEN DOC REV: CPT | Performed by: PHYSICAL MEDICINE & REHABILITATION

## 2024-04-17 PROCEDURE — G8427 DOCREV CUR MEDS BY ELIG CLIN: HCPCS | Performed by: PHYSICAL MEDICINE & REHABILITATION

## 2024-04-17 PROCEDURE — 1123F ACP DISCUSS/DSCN MKR DOCD: CPT | Performed by: PHYSICAL MEDICINE & REHABILITATION

## 2024-04-17 PROCEDURE — 1090F PRES/ABSN URINE INCON ASSESS: CPT | Performed by: PHYSICAL MEDICINE & REHABILITATION

## 2024-04-17 PROCEDURE — 99213 OFFICE O/P EST LOW 20 MIN: CPT | Performed by: PHYSICAL MEDICINE & REHABILITATION

## 2024-04-17 PROCEDURE — 4004F PT TOBACCO SCREEN RCVD TLK: CPT | Performed by: PHYSICAL MEDICINE & REHABILITATION

## 2024-04-17 NOTE — PROGRESS NOTES
Yue Serra presents today for   Chief Complaint   Patient presents with    Lower Back Pain       Is someone accompanying this pt? Yes, spouse    Is the patient using any DME equipment during OV? no      Coordination of Care:  1. Have you been to the ER, urgent care clinic since your last visit? no  Hospitalized since your last visit? no    2. Have you seen or consulted any other health care providers outside of the [facility] since your last visit? no Include any pap smears or colon screening. no        
Medications: medrol pack- side effects  Current Medications:  Previous work-up has included:   MRI lumbar spine 10/4/2-23  T12-L1:  Mild intradiscal degenerative changes are present     L1-L2:    Intradiscal signal loss, small amount of anterior osteophytic formation Central canal is nonstenotic Neuroforamina nonstenotic     L2-L3:    There is a left neuroforaminal herniation question a tiny extrusion fragment extending from the caudal aspect of the herniation Sagittal image 10 series 2 Central canal nonstenotic. Right neuroforamina exiting nerve root unremarkable.Left neuroforamina demonstrates small protrusion-type herniation, exiting left  L2 root is not compressed. On the STIR images there is increased signal within the disc in the opposing  endplates which is identified on image 9 series 4. here is associated decreased signal on the T1-weighted images in the structures  as well Sagittal image 9 series 3 Findings suggest acute edema. Endplates are well-visualized surrounding soft tissues are otherwise  unremarkable consistent with acute noninfectious inflammatory changes, marked     L3-L4:    Intradiscal degenerative changes are present central canal is nonstenotic. Facets demonstrate mild left greater than right hypertrophy and degenerative changes. Right and left neuroforamina demonstrate no significant stenosis exiting nerve roots are not compressed     L4-L5:    Facets demonstrate mild hypertrophic and degenerative changes. Central canal is nonstenotic. There is a diffuse protrusion bulge across the midline. Small protrusion herniation into the right neuroforamina touching the exiting right L4 nerve root. There is also contact from the encroachment of the hypertrophied facet Herniation present into the left neuroforamina however exiting nerve root is not compressed     L5-S1:    This disc appears partially sacralized. Appears otherwise intact recommend careful counting before any surgical  intervention this

## 2024-04-18 ENCOUNTER — TRANSCRIBE ORDERS (OUTPATIENT)
Facility: HOSPITAL | Age: 69
End: 2024-04-18

## 2024-04-18 DIAGNOSIS — Z12.31 ENCOUNTER FOR SCREENING MAMMOGRAM FOR BREAST CANCER: Primary | ICD-10-CM

## 2024-05-04 ENCOUNTER — HOSPITAL ENCOUNTER (OUTPATIENT)
Facility: HOSPITAL | Age: 69
End: 2024-05-04
Attending: FAMILY MEDICINE
Payer: MEDICARE

## 2024-05-04 VITALS — BODY MASS INDEX: 34.36 KG/M2 | WEIGHT: 175 LBS | HEIGHT: 60 IN

## 2024-05-04 DIAGNOSIS — Z12.31 ENCOUNTER FOR SCREENING MAMMOGRAM FOR BREAST CANCER: ICD-10-CM

## 2024-05-04 PROCEDURE — 77063 BREAST TOMOSYNTHESIS BI: CPT

## 2024-06-03 ENCOUNTER — HOSPITAL ENCOUNTER (OUTPATIENT)
Facility: HOSPITAL | Age: 69
Discharge: HOME | End: 2024-06-06
Attending: PHYSICAL MEDICINE & REHABILITATION
Payer: MEDICARE

## 2024-06-03 DIAGNOSIS — M53.3 SACROILIAC JOINT PAIN: ICD-10-CM

## 2024-06-03 PROCEDURE — 72195 MRI PELVIS W/O DYE: CPT

## 2024-06-05 ENCOUNTER — OFFICE VISIT (OUTPATIENT)
Age: 69
End: 2024-06-05
Payer: MEDICARE

## 2024-06-05 VITALS
TEMPERATURE: 97.4 F | OXYGEN SATURATION: 96 % | WEIGHT: 186.6 LBS | BODY MASS INDEX: 36.63 KG/M2 | HEIGHT: 60 IN | HEART RATE: 91 BPM | SYSTOLIC BLOOD PRESSURE: 136 MMHG | DIASTOLIC BLOOD PRESSURE: 74 MMHG

## 2024-06-05 DIAGNOSIS — M25.552 LEFT HIP PAIN: ICD-10-CM

## 2024-06-05 DIAGNOSIS — M53.3 SACROILIAC JOINT PAIN: ICD-10-CM

## 2024-06-05 DIAGNOSIS — M51.36 DDD (DEGENERATIVE DISC DISEASE), LUMBAR: Primary | ICD-10-CM

## 2024-06-05 PROCEDURE — 1090F PRES/ABSN URINE INCON ASSESS: CPT | Performed by: PHYSICAL MEDICINE & REHABILITATION

## 2024-06-05 PROCEDURE — 99214 OFFICE O/P EST MOD 30 MIN: CPT | Performed by: PHYSICAL MEDICINE & REHABILITATION

## 2024-06-05 PROCEDURE — 3017F COLORECTAL CA SCREEN DOC REV: CPT | Performed by: PHYSICAL MEDICINE & REHABILITATION

## 2024-06-05 PROCEDURE — G8428 CUR MEDS NOT DOCUMENT: HCPCS | Performed by: PHYSICAL MEDICINE & REHABILITATION

## 2024-06-05 PROCEDURE — 1123F ACP DISCUSS/DSCN MKR DOCD: CPT | Performed by: PHYSICAL MEDICINE & REHABILITATION

## 2024-06-05 PROCEDURE — G8417 CALC BMI ABV UP PARAM F/U: HCPCS | Performed by: PHYSICAL MEDICINE & REHABILITATION

## 2024-06-05 PROCEDURE — 4004F PT TOBACCO SCREEN RCVD TLK: CPT | Performed by: PHYSICAL MEDICINE & REHABILITATION

## 2024-06-05 PROCEDURE — G8399 PT W/DXA RESULTS DOCUMENT: HCPCS | Performed by: PHYSICAL MEDICINE & REHABILITATION

## 2024-06-05 NOTE — PROGRESS NOTES
Yue Serra presents today for   Chief Complaint   Patient presents with    Back Pain     Back pain review for MRI of Pelvis       Is someone accompanying this pt? Yes,     Is the patient using any DME equipment during OV? NO          Coordination of Care:  1. Have you been to the ER, urgent care clinic since your last visit? No  Hospitalized since your last visit? No    2. Have you seen or consulted any other health care providers outside of the [facility] since your last visit? No Include any pap smears or colon screening. No

## 2024-06-05 NOTE — PROGRESS NOTES
[facility]  [address] [place] [address], [address]  [place] [address]  Phone: [phone]  Fax: [phone]      Patient: Yue Serra                                                                              MRN: [id number]        YOB: 1955          AGE: 69 y.o.             PCP: Devan Woodson MD  Date:  06/06/24    Reason for Consultation: Back Pain (Back pain review for MRI of Pelvis)      HPI:  Yue Serra is a 69 y.o. female  who presented with left low back, does radiate to anterior thigh.  The pain began about 2/2023.  Denies any precipitating incident or trauma.   She has seen Dr. Arevalo who got an MRI of her lumbar spine 10/4/2023 which did show  degenerative changes and small disc protrusion into left foramen at L2/3 . She tired left L2, and 3 SNRB without relief.  She was referred to Dr. Hassan who suggested further eval of her left hip. X-ray left hip unremarkable minimal OA . We tried an IA left hip injection 2/14/2024 without much relief.  On exam her pain was most localized to her SI join and SI provacative maneuvers aggravated her pain. She tried a left SI joint injection 3/19/2024 which unfortunately did not provide relief.  We got an MRI of her pelvis which was also unremarkable.  She also saw Dr. Nuñez at [facility] who got CT lumbar spine 5/3/2024 with transitional anatomy sacralized on left.       Neurologic symptoms: No numbness, tingling, weakness, bowel or bladder changes.  No recent falls      Location: The pain is located in the left low back   Radiation: The pain does radiate left anterior and lateral thigh .    Pain Score: Currently: 7/10    Quality: Pain is of a dull, achingquality.    Aggravating: Pain is exacerbated by walking, standing, and stairs   Alleviating: The pain is alleviated by lying down    Prior Treatments:  Physical therapy: No  Chiropractic treatments   Injections:Yes  11/21/2023- no relief   left L2

## 2024-06-17 ENCOUNTER — PROCEDURE VISIT (OUTPATIENT)
Age: 69
End: 2024-06-17
Payer: MEDICARE

## 2024-06-17 VITALS
BODY MASS INDEX: 36.79 KG/M2 | HEIGHT: 60 IN | RESPIRATION RATE: 18 BRPM | OXYGEN SATURATION: 95 % | WEIGHT: 187.4 LBS | HEART RATE: 79 BPM

## 2024-06-17 DIAGNOSIS — M79.18 MYOFASCIAL PAIN: ICD-10-CM

## 2024-06-17 DIAGNOSIS — M53.3 SACROILIAC JOINT PAIN: Primary | ICD-10-CM

## 2024-06-17 PROCEDURE — 20552 NJX 1/MLT TRIGGER POINT 1/2: CPT | Performed by: PHYSICAL MEDICINE & REHABILITATION

## 2024-06-17 PROCEDURE — 76942 ECHO GUIDE FOR BIOPSY: CPT | Performed by: PHYSICAL MEDICINE & REHABILITATION

## 2024-06-17 RX ORDER — LIDOCAINE HYDROCHLORIDE 10 MG/ML
5 INJECTION, SOLUTION INFILTRATION; PERINEURAL ONCE
Status: COMPLETED | OUTPATIENT
Start: 2024-06-17 | End: 2024-06-17

## 2024-06-17 RX ORDER — TRIAMCINOLONE ACETONIDE 40 MG/ML
40 INJECTION, SUSPENSION INTRA-ARTICULAR; INTRAMUSCULAR ONCE
Status: COMPLETED | OUTPATIENT
Start: 2024-06-17 | End: 2024-06-17

## 2024-06-17 RX ADMIN — LIDOCAINE HYDROCHLORIDE 5 ML: 10 INJECTION, SOLUTION INFILTRATION; PERINEURAL at 16:47

## 2024-06-17 RX ADMIN — TRIAMCINOLONE ACETONIDE 40 MG: 40 INJECTION, SUSPENSION INTRA-ARTICULAR; INTRAMUSCULAR at 16:50

## 2024-06-17 NOTE — PROGRESS NOTES
[facility]  [address] [place] [address], [address]  [place] [address]  Phone: [phone]  Fax: [phone]      Patient: Yue Serra                                                                              MRN: [id number]        YOB: 1955          AGE: 69 y.o.             PCP: Devan Woodson MD  Date:  06/17/24    Reason for Consultation: Back Problem, Pain, and Back Pain      HPI:  Yue Serra is a 69 y.o. female  who presented with left low back, does radiate to anterior thigh.  The pain began about 2/2023.  Denies any precipitating incident or trauma.   She has seen Dr. Arevalo who got an MRI of her lumbar spine 10/4/2023 which did show  degenerative changes and small disc protrusion into left foramen at L2/3 . She tired left L2, and 3 SNRB without relief.  She was referred to Dr. Hassan who suggested further eval of her left hip. X-ray left hip unremarkable minimal OA . We tried an IA left hip injection 2/14/2024 without much relief.  On exam her pain was most localized to her SI join and SI provacative maneuvers aggravated her pain. She tried a left SI joint injection 3/19/2024 which unfortunately did not provide relief.  We got an MRI of her pelvis which was also unremarkable.  She also saw Dr. Nuñez at [facility] who got CT lumbar spine 5/3/2024 with transitional anatomy sacralized on left.       Neurologic symptoms: No numbness, tingling, weakness, bowel or bladder changes.  No recent falls      Location: The pain is located in the left low back   Radiation: The pain does radiate left anterior and lateral thigh .    Pain Score: Currently: 7/10    Quality: Pain is of a dull, achingquality.    Aggravating: Pain is exacerbated by walking, standing, and stairs   Alleviating: The pain is alleviated by lying down    Prior Treatments:  Physical therapy: No  Chiropractic treatments   Injections:Yes  11/21/2023- no relief   left L2 Selective Nerve Root

## 2024-06-17 NOTE — PROGRESS NOTES
Yue Serra presents today for   Chief Complaint   Patient presents with    Back Problem    Pain    Back Pain       Is someone accompanying this pt? yes    Is the patient using any DME equipment during OV? no    Depression Screening:       No data to display                Learning Assessment:  Failed to redirect to the Timeline version of the [employer] SmartLink.    Abuse Screening:       No data to display                Fall Risk  Failed to redirect to the Timeline version of the [employer] SmartLink.    OPIOID RISK TOOL  Failed to redirect to the Timeline version of the [employer] SmartLink.    Coordination of Care:  1. Have you been to the ER, urgent care clinic since your last visit? no  Hospitalized since your last visit? no    2. Have you seen or consulted any other health care providers outside of the [facility] since your last visit? no Include any pap smears or colon screening. no

## 2025-04-18 ENCOUNTER — TRANSCRIBE ORDERS (OUTPATIENT)
Facility: HOSPITAL | Age: 70
End: 2025-04-18

## 2025-04-18 ENCOUNTER — TRANSCRIBE ORDERS (OUTPATIENT)
Dept: SCHEDULING | Age: 70
End: 2025-04-18

## 2025-04-18 DIAGNOSIS — Z12.39 ENCOUNTER FOR SPECIAL SCREENING EXAMINATION FOR NEOPLASM OF BREAST: Primary | ICD-10-CM

## 2025-04-18 DIAGNOSIS — Z12.31 ENCOUNTER FOR SCREENING MAMMOGRAM FOR HIGH-RISK PATIENT: ICD-10-CM

## 2025-04-18 DIAGNOSIS — Z12.31 ENCOUNTER FOR SCREENING MAMMOGRAM FOR HIGH-RISK PATIENT: Primary | ICD-10-CM

## 2025-05-28 NOTE — PROGRESS NOTES
Yue Serra  1955   Chief Complaint   Patient presents with    Shoulder Pain     Right shoulder pain        HISTORY OF PRESENT ILLNESS  Yue Serra is a 70 y.o. female who presents today for evaluation of right shoulder pain.  Pain is a 8/10. Pain has been present for a week. Notes she exacerbated her shoulder when she was pulling herself into her van. She underwent a right rotator cuff repair in 2021. She is unable to raise her arm. She had a spinal stimulator put in place December 2024.     Has tried following treatments: Injections:No; Brace:No; Therapy:No; Cane/Crutch:No      Allergies   Allergen Reactions    Iodinated Contrast Media Swelling     Swelling on injection site only. Pt feels \"vein blown.\" No hives/dyspnea        Past Medical History:   Diagnosis Date    Chronic kidney disease     kidney stones    Joint swelling     Right shoulder pain       Social History       Tobacco History       Smoking Status  Every Day Current Packs/Day  0.5 packs/day Smoking Tobacco Type  Cigarettes   Pack Year History     Packs/Day From To Years    0.5   0.0      Smokeless Tobacco Use  Never              Alcohol History       Alcohol Use Status  Yes Drinks/Week  0 Glasses of wine, 0 Cans of beer, 0 Shots of liquor, 5 Drinks containing 0.5 oz of alcohol per week Amount  5.0 standard drinks of alcohol/wk              Drug Use       Drug Use Status  Never              Sexual Activity       Sexually Active  Yes Partners  Male                   Past Surgical History:   Procedure Laterality Date    APPENDECTOMY      BUNIONECTOMY      FOOT SURGERY  1992    GI      flopy colon    GYN      ov cyst,  hysterectomy    HYSTERECTOMY (CERVIX STATUS UNKNOWN)      ORTHOPEDIC SURGERY      right foot reconst    SHOULDER SURGERY  11/2/2021      Family History   Problem Relation Age of Onset    Alzheimer's Disease Mother     Breast Cancer Mother     Cancer Father     Breast Cancer Sister      Current Outpatient

## 2025-05-29 ENCOUNTER — OFFICE VISIT (OUTPATIENT)
Age: 70
End: 2025-05-29

## 2025-05-29 ENCOUNTER — HOSPITAL ENCOUNTER (OUTPATIENT)
Facility: HOSPITAL | Age: 70
Discharge: HOME | End: 2025-05-29
Attending: FAMILY MEDICINE
Payer: MEDICARE

## 2025-05-29 VITALS — WEIGHT: 175 LBS | HEIGHT: 60 IN | BODY MASS INDEX: 34.36 KG/M2

## 2025-05-29 VITALS — WEIGHT: 192.8 LBS | BODY MASS INDEX: 37.85 KG/M2 | HEIGHT: 60 IN

## 2025-05-29 DIAGNOSIS — M25.511 RIGHT SHOULDER PAIN, UNSPECIFIED CHRONICITY: Primary | ICD-10-CM

## 2025-05-29 DIAGNOSIS — Z12.31 ENCOUNTER FOR SCREENING MAMMOGRAM FOR HIGH-RISK PATIENT: ICD-10-CM

## 2025-05-29 DIAGNOSIS — M75.101 TEAR OF RIGHT ROTATOR CUFF, UNSPECIFIED TEAR EXTENT, UNSPECIFIED WHETHER TRAUMATIC: ICD-10-CM

## 2025-05-29 PROCEDURE — 77063 BREAST TOMOSYNTHESIS BI: CPT

## 2025-05-29 PROCEDURE — 77067 SCR MAMMO BI INCL CAD: CPT

## 2025-05-29 RX ORDER — TRIAMCINOLONE ACETONIDE 40 MG/ML
40 INJECTION, SUSPENSION INTRA-ARTICULAR; INTRAMUSCULAR ONCE
Status: COMPLETED | OUTPATIENT
Start: 2025-05-29 | End: 2025-05-29

## 2025-05-29 RX ORDER — GABAPENTIN 300 MG/1
300 CAPSULE ORAL 3 TIMES DAILY
COMMUNITY
Start: 2025-05-09

## 2025-05-29 RX ADMIN — TRIAMCINOLONE ACETONIDE 40 MG: 40 INJECTION, SUSPENSION INTRA-ARTICULAR; INTRAMUSCULAR at 10:14

## 2025-06-26 ENCOUNTER — OFFICE VISIT (OUTPATIENT)
Age: 70
End: 2025-06-26
Payer: MEDICARE

## 2025-06-26 DIAGNOSIS — M75.101 TEAR OF RIGHT ROTATOR CUFF, UNSPECIFIED TEAR EXTENT, UNSPECIFIED WHETHER TRAUMATIC: Primary | ICD-10-CM

## 2025-06-26 PROCEDURE — G8427 DOCREV CUR MEDS BY ELIG CLIN: HCPCS | Performed by: ORTHOPAEDIC SURGERY

## 2025-06-26 PROCEDURE — 1090F PRES/ABSN URINE INCON ASSESS: CPT | Performed by: ORTHOPAEDIC SURGERY

## 2025-06-26 PROCEDURE — 1123F ACP DISCUSS/DSCN MKR DOCD: CPT | Performed by: ORTHOPAEDIC SURGERY

## 2025-06-26 PROCEDURE — 1160F RVW MEDS BY RX/DR IN RCRD: CPT | Performed by: ORTHOPAEDIC SURGERY

## 2025-06-26 PROCEDURE — 1159F MED LIST DOCD IN RCRD: CPT | Performed by: ORTHOPAEDIC SURGERY

## 2025-06-26 PROCEDURE — 3017F COLORECTAL CA SCREEN DOC REV: CPT | Performed by: ORTHOPAEDIC SURGERY

## 2025-06-26 PROCEDURE — G8399 PT W/DXA RESULTS DOCUMENT: HCPCS | Performed by: ORTHOPAEDIC SURGERY

## 2025-06-26 PROCEDURE — G8417 CALC BMI ABV UP PARAM F/U: HCPCS | Performed by: ORTHOPAEDIC SURGERY

## 2025-06-26 PROCEDURE — 99212 OFFICE O/P EST SF 10 MIN: CPT | Performed by: ORTHOPAEDIC SURGERY

## 2025-06-26 PROCEDURE — 4004F PT TOBACCO SCREEN RCVD TLK: CPT | Performed by: ORTHOPAEDIC SURGERY

## 2025-06-26 PROCEDURE — 1126F AMNT PAIN NOTED NONE PRSNT: CPT | Performed by: ORTHOPAEDIC SURGERY

## (undated) DEVICE — SUTURE MCRYL SZ 4-0 L18IN ABSRB UD L19MM PS-2 3/8 CIR PRIM Y496G

## (undated) DEVICE — PREP SKN CHLRAPRP APL 26ML STR --

## (undated) DEVICE — GARMENT,MEDLINE,DVT,SEQUENTIAL,CALF,MD: Brand: MEDLINE

## (undated) DEVICE — NEEDLE SUT PASS FOR ROT CUF LABRAL REP MULTFI SCORPION

## (undated) DEVICE — PAD,NON-ADHERENT,3X8,STERILE,LF,1/PK: Brand: MEDLINE

## (undated) DEVICE — SPONGE LAP 18X18IN STRL -- 5/PK

## (undated) DEVICE — SHOULDER SUSPENSION KIT 6 PER BOX

## (undated) DEVICE — APPLICATOR BNDG 1MM ADH PREMIERPRO EXOFIN

## (undated) DEVICE — STERILE POLYISOPRENE POWDER-FREE SURGICAL GLOVES: Brand: PROTEXIS

## (undated) DEVICE — SKIN MARKER,REGULAR TIP WITH RULER AND LABELS: Brand: DEVON

## (undated) DEVICE — DRAPE,REIN 53X77,STERILE: Brand: MEDLINE

## (undated) DEVICE — BUR SHV CUT HLLW 6 FLUT 5.5MM --

## (undated) DEVICE — BLANKET WRM AD W50XL85.8IN PACU FULL BODY FORC AIR

## (undated) DEVICE — SUTURE ABSORBABLE BRAIDED 0 CTXB 36 IN VIO PDS II ZB370

## (undated) DEVICE — SOLUTION IRRIG 3000ML 0.9% SOD CHL FLX CONT 0797208] ICU MEDICAL INC]

## (undated) DEVICE — SUTURE VCRL SZ 3-0 L27IN ABSRB UD L36MM CT-1 1/2 CIR J258H

## (undated) DEVICE — SUTURE FIBERLINK SZ 2-0 L26IN NONABSORBABLE BLU CLS LOOP AR7235

## (undated) DEVICE — PACK PROCEDURE SURG ORTH SHLDR CUST

## (undated) DEVICE — SUTURE FIBERWIRE SZ 2 W/ TAPERED NEEDLE BLUE L38IN NONABSORB BLU L26.5MM 1/2 CIRCLE AR7200

## (undated) DEVICE — NEEDLE SPNL 22GA L3.5IN BLK HUB S STL REG WALL FIT STYL W/

## (undated) DEVICE — 90-S CRUISE, SUCTION PROBE, NON-BENDABLE, MAX CUT LEVEL 1: Brand: SERFAS ENERGY

## (undated) DEVICE — SOFT SILICONE HYDROCELLULAR SACRUM DRESSING WITH LOCK AWAY LAYER: Brand: ALLEVYN LIFE SACRUM (LARGE) PACK OF 10

## (undated) DEVICE — BLADE SHV DIA4MM RED RESECT FOR GEN DEB REM OF PERIOST FR

## (undated) DEVICE — BLANKET WRM W40.2XL55.9IN IORT LO BODY + MISTRAL AIR

## (undated) DEVICE — 90-S MAX, SUCTION PROBE, NON-BENDABLE, MAX CUT LEVEL 11: Brand: SERFAS ENERGY

## (undated) DEVICE — 4-PORT MANIFOLD: Brand: NEPTUNE 2

## (undated) DEVICE — BRACE SHLDR M FA L135 145IN UNIV AIRMESH BRTH SLNG 15DEG

## (undated) DEVICE — INFLOW CASSETTE TUBING, DO NOT USE IF PACKAGE IS DAMAGED: Brand: CROSSFLOW

## (undated) DEVICE — CANNULA THREADED FLEX 8.0 X 72MM: Brand: CLEAR-TRAC